# Patient Record
Sex: FEMALE | Race: WHITE | NOT HISPANIC OR LATINO | Employment: OTHER | URBAN - NONMETROPOLITAN AREA
[De-identification: names, ages, dates, MRNs, and addresses within clinical notes are randomized per-mention and may not be internally consistent; named-entity substitution may affect disease eponyms.]

---

## 2021-06-18 ENCOUNTER — MEDICAL CORRESPONDENCE (OUTPATIENT)
Dept: HEALTH INFORMATION MANAGEMENT | Facility: OTHER | Age: 86
End: 2021-06-18

## 2024-04-30 ENCOUNTER — TELEPHONE (OUTPATIENT)
Dept: FAMILY MEDICINE | Facility: OTHER | Age: 89
End: 2024-04-30

## 2024-04-30 ENCOUNTER — OFFICE VISIT (OUTPATIENT)
Dept: FAMILY MEDICINE | Facility: OTHER | Age: 89
End: 2024-04-30
Attending: FAMILY MEDICINE
Payer: MEDICARE

## 2024-04-30 VITALS
HEART RATE: 65 BPM | WEIGHT: 154.4 LBS | SYSTOLIC BLOOD PRESSURE: 120 MMHG | OXYGEN SATURATION: 94 % | TEMPERATURE: 98.2 F | DIASTOLIC BLOOD PRESSURE: 62 MMHG | HEIGHT: 62 IN | RESPIRATION RATE: 20 BRPM | BODY MASS INDEX: 28.41 KG/M2

## 2024-04-30 DIAGNOSIS — E55.9 VITAMIN D DEFICIENCY: ICD-10-CM

## 2024-04-30 DIAGNOSIS — Z76.89 ENCOUNTER TO ESTABLISH CARE: ICD-10-CM

## 2024-04-30 DIAGNOSIS — I10 ESSENTIAL HYPERTENSION: ICD-10-CM

## 2024-04-30 DIAGNOSIS — L89.311 PRESSURE INJURY OF RIGHT BUTTOCK, STAGE 1: ICD-10-CM

## 2024-04-30 DIAGNOSIS — Z00.00 ENCOUNTER FOR MEDICARE ANNUAL WELLNESS EXAM: Primary | ICD-10-CM

## 2024-04-30 DIAGNOSIS — F41.1 GENERALIZED ANXIETY DISORDER: ICD-10-CM

## 2024-04-30 DIAGNOSIS — Z91.81 AT RISK FOR FALLING: ICD-10-CM

## 2024-04-30 DIAGNOSIS — K59.09 CHRONIC CONSTIPATION: ICD-10-CM

## 2024-04-30 DIAGNOSIS — Z23 NEED FOR COVID-19 VACCINE: ICD-10-CM

## 2024-04-30 DIAGNOSIS — R32 URINARY INCONTINENCE, UNSPECIFIED TYPE: ICD-10-CM

## 2024-04-30 DIAGNOSIS — Z99.89 USES WALKER: ICD-10-CM

## 2024-04-30 DIAGNOSIS — L29.9 ITCHING: ICD-10-CM

## 2024-04-30 DIAGNOSIS — J45.30 MILD PERSISTENT ASTHMA WITHOUT COMPLICATION: ICD-10-CM

## 2024-04-30 LAB
ALBUMIN SERPL BCG-MCNC: 3.9 G/DL (ref 3.5–5.2)
ALP SERPL-CCNC: 90 U/L (ref 40–150)
ALT SERPL W P-5'-P-CCNC: 14 U/L (ref 0–50)
ANION GAP SERPL CALCULATED.3IONS-SCNC: 11 MMOL/L (ref 7–15)
AST SERPL W P-5'-P-CCNC: 22 U/L (ref 0–45)
BILIRUB SERPL-MCNC: 0.4 MG/DL
BUN SERPL-MCNC: 20.5 MG/DL (ref 8–23)
CALCIUM SERPL-MCNC: 9.8 MG/DL (ref 8.2–9.6)
CHLORIDE SERPL-SCNC: 100 MMOL/L (ref 98–107)
CREAT SERPL-MCNC: 0.86 MG/DL (ref 0.51–0.95)
DEPRECATED HCO3 PLAS-SCNC: 26 MMOL/L (ref 22–29)
EGFRCR SERPLBLD CKD-EPI 2021: 61 ML/MIN/1.73M2
ERYTHROCYTE [DISTWIDTH] IN BLOOD BY AUTOMATED COUNT: 13.6 % (ref 10–15)
GLUCOSE SERPL-MCNC: 89 MG/DL (ref 70–99)
HCT VFR BLD AUTO: 44.1 % (ref 35–47)
HGB BLD-MCNC: 14.4 G/DL (ref 11.7–15.7)
MCH RBC QN AUTO: 31.4 PG (ref 26.5–33)
MCHC RBC AUTO-ENTMCNC: 32.7 G/DL (ref 31.5–36.5)
MCV RBC AUTO: 96 FL (ref 78–100)
PLATELET # BLD AUTO: 258 10E3/UL (ref 150–450)
POTASSIUM SERPL-SCNC: 4.3 MMOL/L (ref 3.4–5.3)
PROT SERPL-MCNC: 7.4 G/DL (ref 6.4–8.3)
RBC # BLD AUTO: 4.59 10E6/UL (ref 3.8–5.2)
SODIUM SERPL-SCNC: 137 MMOL/L (ref 135–145)
VIT B12 SERPL-MCNC: 988 PG/ML (ref 232–1245)
WBC # BLD AUTO: 8.6 10E3/UL (ref 4–11)

## 2024-04-30 PROCEDURE — 82607 VITAMIN B-12: CPT | Mod: ZL | Performed by: FAMILY MEDICINE

## 2024-04-30 PROCEDURE — 99417 PROLNG OP E/M EACH 15 MIN: CPT | Performed by: FAMILY MEDICINE

## 2024-04-30 PROCEDURE — 99205 OFFICE O/P NEW HI 60 MIN: CPT | Performed by: FAMILY MEDICINE

## 2024-04-30 PROCEDURE — 90480 ADMN SARSCOV2 VAC 1/ONLY CMP: CPT

## 2024-04-30 PROCEDURE — 80053 COMPREHEN METABOLIC PANEL: CPT | Mod: ZL | Performed by: FAMILY MEDICINE

## 2024-04-30 PROCEDURE — 82306 VITAMIN D 25 HYDROXY: CPT | Mod: ZL | Performed by: FAMILY MEDICINE

## 2024-04-30 PROCEDURE — 85014 HEMATOCRIT: CPT | Mod: ZL | Performed by: FAMILY MEDICINE

## 2024-04-30 PROCEDURE — 36415 COLL VENOUS BLD VENIPUNCTURE: CPT | Mod: ZL | Performed by: FAMILY MEDICINE

## 2024-04-30 PROCEDURE — G0463 HOSPITAL OUTPT CLINIC VISIT: HCPCS | Mod: 25 | Performed by: FAMILY MEDICINE

## 2024-04-30 RX ORDER — ALBUTEROL SULFATE 90 UG/1
2 AEROSOL, METERED RESPIRATORY (INHALATION) EVERY 4 HOURS PRN
COMMUNITY
End: 2024-04-30

## 2024-04-30 RX ORDER — TRIAMTERENE/HYDROCHLOROTHIAZID 37.5-25 MG
1 TABLET ORAL DAILY
Qty: 90 TABLET | Refills: 4 | Status: SHIPPED | OUTPATIENT
Start: 2024-04-30

## 2024-04-30 RX ORDER — CITALOPRAM HYDROBROMIDE 10 MG/1
10 TABLET ORAL DAILY
Qty: 90 TABLET | Refills: 4 | Status: SHIPPED | OUTPATIENT
Start: 2024-04-30

## 2024-04-30 RX ORDER — BIOTIN 1 MG
1000 TABLET ORAL DAILY
COMMUNITY

## 2024-04-30 RX ORDER — SODIUM PHOSPHATE,MONO-DIBASIC 19G-7G/118
1 ENEMA (ML) RECTAL DAILY
COMMUNITY

## 2024-04-30 RX ORDER — FLUTICASONE PROPIONATE 220 UG/1
2 AEROSOL, METERED RESPIRATORY (INHALATION) 2 TIMES DAILY
Qty: 36 G | Refills: 11 | Status: SHIPPED | OUTPATIENT
Start: 2024-04-30

## 2024-04-30 RX ORDER — TRIAMTERENE/HYDROCHLOROTHIAZID 37.5-25 MG
1 TABLET ORAL DAILY
COMMUNITY
End: 2024-04-30

## 2024-04-30 RX ORDER — DILTIAZEM HYDROCHLORIDE 180 MG/1
180 CAPSULE, EXTENDED RELEASE ORAL DAILY
COMMUNITY
End: 2024-04-30

## 2024-04-30 RX ORDER — ASPIRIN 81 MG/1
81 TABLET ORAL DAILY
COMMUNITY

## 2024-04-30 RX ORDER — DILTIAZEM HYDROCHLORIDE 180 MG/1
180 CAPSULE, EXTENDED RELEASE ORAL DAILY
Qty: 90 CAPSULE | Refills: 4 | Status: SHIPPED | OUTPATIENT
Start: 2024-04-30

## 2024-04-30 RX ORDER — FLUTICASONE PROPIONATE 220 UG/1
2 AEROSOL, METERED RESPIRATORY (INHALATION) 2 TIMES DAILY
COMMUNITY
End: 2024-04-30

## 2024-04-30 RX ORDER — CITALOPRAM HYDROBROMIDE 10 MG/1
10 TABLET ORAL DAILY
COMMUNITY
End: 2024-04-30

## 2024-04-30 RX ORDER — MULTIVITAMIN
1 TABLET ORAL DAILY
COMMUNITY

## 2024-04-30 RX ORDER — TRIAMCINOLONE ACETONIDE 5 MG/G
OINTMENT TOPICAL
Qty: 45 G | Refills: 11 | Status: SHIPPED | OUTPATIENT
Start: 2024-04-30

## 2024-04-30 RX ORDER — ALBUTEROL SULFATE 90 UG/1
2 AEROSOL, METERED RESPIRATORY (INHALATION) EVERY 4 HOURS PRN
Qty: 18 G | Refills: 11 | Status: SHIPPED | OUTPATIENT
Start: 2024-04-30

## 2024-04-30 SDOH — HEALTH STABILITY: PHYSICAL HEALTH: ON AVERAGE, HOW MANY DAYS PER WEEK DO YOU ENGAGE IN MODERATE TO STRENUOUS EXERCISE (LIKE A BRISK WALK)?: 2 DAYS

## 2024-04-30 SDOH — HEALTH STABILITY: PHYSICAL HEALTH: ON AVERAGE, HOW MANY MINUTES DO YOU ENGAGE IN EXERCISE AT THIS LEVEL?: 10 MIN

## 2024-04-30 ASSESSMENT — PAIN SCALES - GENERAL: PAINLEVEL: NO PAIN (0)

## 2024-04-30 ASSESSMENT — SOCIAL DETERMINANTS OF HEALTH (SDOH): HOW OFTEN DO YOU GET TOGETHER WITH FRIENDS OR RELATIVES?: MORE THAN THREE TIMES A WEEK

## 2024-04-30 NOTE — PROGRESS NOTES
Preventive Care Visit  [facility] AND [facility]  KONG WHITAKER MD, Family Medicine  Apr 30, 2024    Assessment & Plan       ICD-10-CM    1. Encounter for Medicare annual wellness exam  Z00.00       2. Encounter to establish care  Z76.89       3. Need for COVID-19 vaccine  Z23 COVID-19 12+ (2023-24) (PFIZER)      4. Essential hypertension  I10 Vitamin B12     Comprehensive Metabolic Panel     Vitamin D Total     diltiazem ER (DILT-XR) 180 MG 24 hr capsule     triamterene-HCTZ (MAXZIDE-25) 37.5-25 MG tablet     Vitamin B12     Comprehensive Metabolic Panel     Vitamin D Total      5. Chronic constipation  K59.09       6. Mild persistent asthma without complication  J45.30 CBC W PLT No Diff     albuterol (PROAIR HFA/PROVENTIL HFA/VENTOLIN HFA) 108 (90 Base) MCG/ACT inhaler     fluticasone (FLOVENT HFA) 220 MCG/ACT inhaler     CBC W PLT No Diff      7. Generalized anxiety disorder  F41.1 citalopram (CELEXA) 10 MG tablet      8. Vitamin D deficiency  E55.9 Vitamin D Total     Vitamin D Total      9. Itching  L29.9 triamcinolone (KENALOG) 0.5 % external ointment      10. At risk for falling  Z91.81       11. Uses walker  Z99.89       12. Urinary incontinence, unspecified type  R32       13. Pressure injury of right buttock, stage 1  L89.311         Past medical history and past surgeries are updated today.  Care everywhere is accessed and records received.  Medications are reviewed including her over-the-counter medications.  Constipation care plan:    Patient Instructions   Metamucil - can take up to 3 times a day -  this would be the first choice  Next with this is colace (stool softener)    Kiwi     Labs today - kidney, potassium, vitamin D, vitamin B12, blood sugar, liver function    Asthma care reviewed.  In the past, she had had an as needed prescription for prednisone.  I would prefer that they contact the clinic or be seen at her assisted living for assessment.  Continue same treatment for  "hypertension  Her pressure sore shows intact skin with overlying color change, present for years.  She does sleep in the chair that likely contributes to this.  We discussed different barrier creams/ointments to reduce friction and recurrent injury.  Discussed fall risk reduction  COVID vaccination updated today  Continue same asthma care  Continue citalopram for anxiety  Patient states that she does have a living well, will bring in a copy.  In short, if it is felt that she could have meaningful recovery, she would want treatment.  No meaningful recovery, then no life-saving extensive measures to be taken.        79 minutes spent by me on the date of the encounter doing chart review, review of outside records, review of test results, interpretation of tests, patient visit, documentation, and discussion with family       BMI  Estimated body mass index is 28.01 kg/m  as calculated from the following:    Height as of this encounter: 1.581 m (5' 2.25\").    Weight as of this encounter: 70 kg (154 lb 6.4 oz).       Counseling  Appropriate preventive services were discussed with this patient, including applicable screening as appropriate for fall prevention, nutrition, physical activity, Tobacco-use cessation, weight loss and cognition.  Checklist reviewing preventive services available has been given to the patient.  Reviewed patient's diet, addressing concerns and/or questions.   She is at risk for lack of exercise and has been provided with information to increase physical activity for the benefit of her well-being.   The patient was instructed to see the dentist every 6 months.   She is at risk for psychosocial distress and has been provided with information to reduce risk.   Updated plan of care.  Patient reported difficulty with activities of daily living were addressed today.The patient was provided with written information regarding signs of hearing loss.   Information on urinary incontinence and treatment options " given to patient.         Ca Gamble is a 89 year old, presenting for the following:  Medicare Visit (Annual well visit) and Establish Care        4/30/2024    11:22 AM   Additional Questions   Roomed by Elisha ROY LPN   Accompanied by Accompanied by daughter, Niurka     Health Care Directive  Patient does not have a Health Care Directive or Living Will: Patient states has Advance Directive and will bring in a copy to clinic.    CARLTON Nam is a 89 year old female in with her daughter for Medicare wellness visit and to establish care.  She moved out of her home last fall, has been living with family members.  She now has a place at [facility].  She has been there for a week.  She states she is adjusting to the change, the routine.  Her apartment is small but appropriate, she likes the view and the balcony and that family can come and visit her there.    Asthma - onset for many years. Has flovent and albuterol.  Hasn't needed prednisone in a long time; hospitalized in the past 5+ years  Biotin - for her hair  Anxiety medication - celexa, helps to keep her steady    Recurrent UTIs, last was last summer    History of bilateral TKA - this has been very good   Sore on her bottom    Hypertension  - 15+ years, pretty stable on the amt   Constipation - chronic, goes once a day on averal, has used metamucil, dulcolax, senna and miralax            4/30/2024   General Health   How would you rate your overall physical health? Good   Feel stress (tense, anxious, or unable to sleep) Only a little   (!) STRESS CONCERN      4/30/2024   Nutrition   Diet: Other   If other, please elaborate: high fiber         4/30/2024   Exercise   Days per week of moderate/strenous exercise 2 days   Average minutes spent exercising at this level 10 min   (!) EXERCISE CONCERN      4/30/2024   Social Factors   Frequency of gathering with friends or relatives More than three times a week   Worry food won't last until get money to buy  more No   Food not last or not have enough money for food? No   Do you have housing?  Yes   Are you worried about losing your housing? No   Lack of transportation? No   Unable to get utilities (heat,electricity)? No         4/30/2024   Fall Risk   Fallen 2 or more times in the past year? No   Trouble with walking or balance? Yes   Gait Speed Test (Document in seconds) 7.59          4/30/2024   Activities of Daily Living- Home Safety   Needs help with the following daily activites Transportation    Shopping    Preparing meals    Housework    Bathing    Laundry    Medication administration    Money management   Safety concerns in the home None of the above         4/30/2024   Dental   Dentist two times every year? (!) NO         4/30/2024   Hearing Screening   Hearing concerns? (!) I NEED TO ASK PEOPLE TO SPEAK UP OR REPEAT THEMSELVES.    (!) IT'S HARD TO FOLLOW A CONVERSATION IN A NOISY RESTAURANT OR CROWDED ROOM.    (!) TROUBLE UNDERSTANDING SOFT OR WHISPERED SPEECH.         4/30/2024   Driving Risk Screening   Patient/family members have concerns about driving (!) DECLINE         4/30/2024   General Alertness/Fatigue Screening   Have you been more tired than usual lately? No         4/30/2024   Urinary Incontinence Screening   Bothered by leaking urine in past 6 months Yes         4/30/2024   TB Screening   Were you born outside of the [place]? No         Today's PHQ-2 Score:       4/30/2024    11:16 AM   PHQ-2 ( 1999 [employer])   Q1: Little interest or pleasure in doing things 0   Q2: Feeling down, depressed or hopeless 0   PHQ-2 Score 0   Q1: Little interest or pleasure in doing things Not at all   Q2: Feeling down, depressed or hopeless Not at all   PHQ-2 Score 0           4/30/2024   Substance Use   Alcohol more than 3/day or more than 7/wk No   Do you have a current opioid prescription? No   How severe/bad is pain from 1 to 10? 0/10 (No Pain)   Do you use any other substances recreationally? No     Social History  "    Tobacco Use    Smoking status: Never     Passive exposure: Past    Smokeless tobacco: Never   Vaping Use    Vaping status: Never Used   Substance Use Topics    Alcohol use: Yes     Comment: occasionally    Drug use: Never          Mammogram Screening - Mammography discussed and declined          Reviewed and updated as needed this visit by Provider       Med Hx  Surg Hx  Fam Hx            Past Medical History:   Diagnosis Date    Asthma     Essential hypertension      Past Surgical History:   Procedure Laterality Date    CATARACT EXTRACTION Bilateral     REPLACEMENT TOTAL KNEE Bilateral      Current providers sharing in care for this patient include:  Patient Care Team:  Dotty Smith MD as PCP - General (Family Medicine)    The following health maintenance items are reviewed in Epic and correct as of today:  Health Maintenance   Topic Date Due    ASTHMA ACTION PLAN  Never done    ASTHMA CONTROL TEST  Never done    DTAP/TDAP/TD IMMUNIZATION (1 - Tdap) Never done    RSV VACCINE (Pregnancy & 60+) (1 - 1-dose 60+ series) Never done    MEDICARE ANNUAL WELLNESS VISIT  Never done    FALL RISK ASSESSMENT  04/30/2025    ADVANCE CARE PLANNING  04/30/2029    PHQ-2 (once per calendar year)  Completed    INFLUENZA VACCINE  Completed    Pneumococcal Vaccine: 65+ Years  Completed    COVID-19 Vaccine  Completed    IPV IMMUNIZATION  Aged Out    HPV IMMUNIZATION  Aged Out    MENINGITIS IMMUNIZATION  Aged Out    RSV MONOCLONAL ANTIBODY  Aged Out    ZOSTER IMMUNIZATION  Discontinued         Review of Systems  She feels that overall her hearing is pretty good  History of cataract surgery, wears glasses  Memory, problems with name recall     Objective    Exam  /62 (BP Location: Right arm, Patient Position: Sitting, Cuff Size: Adult Regular)   Pulse 65   Temp 98.2  F (36.8  C) (Temporal)   Resp 20   Ht 1.581 m (5' 2.25\")   Wt 70 kg (154 lb 6.4 oz)   LMP 04/30/1979   SpO2 94%   Breastfeeding No   BMI " "28.01 kg/m     Estimated body mass index is 28.01 kg/m  as calculated from the following:    Height as of this encounter: 1.581 m (5' 2.25\").    Weight as of this encounter: 70 kg (154 lb 6.4 oz).    Physical Exam  GENERAL: alert and no distress  EYES: Eyes grossly normal to inspection, PERRL and conjunctivae and sclerae normal  HENT: ear canals and TM's normal, nose and mouth without ulcers or lesions  RESP: lungs clear to auscultation - no rales, rhonchi or wheezes  CV: RRR  ABDOMEN: soft, nontender, no hepatosplenomegaly, no masses and bowel sounds normal  SKIN: right buttock with 3cm area of erythema but skin is intact   PSYCH: mentation appears normal, affect normal/bright        4/30/2024   Mini Cog   Clock Draw Score 2 Normal   3 Item Recall 3 objects recalled   Mini Cog Total Score 5     Results for orders placed or performed in visit on 04/30/24   Vitamin B12     Status: Normal   Result Value Ref Range    Vitamin B12 988 232 - 1,245 pg/mL   Comprehensive Metabolic Panel     Status: Abnormal   Result Value Ref Range    Sodium 137 135 - 145 mmol/L    Potassium 4.3 3.4 - 5.3 mmol/L    Carbon Dioxide (CO2) 26 22 - 29 mmol/L    Anion Gap 11 7 - 15 mmol/L    Urea Nitrogen 20.5 8.0 - 23.0 mg/dL    Creatinine 0.86 0.51 - 0.95 mg/dL    GFR Estimate 61 >60 mL/min/1.73m2    Calcium 9.8 (H) 8.2 - 9.6 mg/dL    Chloride 100 98 - 107 mmol/L    Glucose 89 70 - 99 mg/dL    Alkaline Phosphatase 90 40 - 150 U/L    AST 22 0 - 45 U/L    ALT 14 0 - 50 U/L    Protein Total 7.4 6.4 - 8.3 g/dL    Albumin 3.9 3.5 - 5.2 g/dL    Bilirubin Total 0.4 <=1.2 mg/dL   CBC W PLT No Diff     Status: Normal   Result Value Ref Range    WBC Count 8.6 4.0 - 11.0 10e3/uL    RBC Count 4.59 3.80 - 5.20 10e6/uL    Hemoglobin 14.4 11.7 - 15.7 g/dL    Hematocrit 44.1 35.0 - 47.0 %    MCV 96 78 - 100 fL    MCH 31.4 26.5 - 33.0 pg    MCHC 32.7 31.5 - 36.5 g/dL    RDW 13.6 10.0 - 15.0 %    Platelet Count 258 150 - 450 10e3/uL            Signed " Electronically by: KONG WHITAKER MD

## 2024-04-30 NOTE — PATIENT INSTRUCTIONS
Metamucil - can take up to 3 times a day -  this would be the first choice  Next with this is colace (stool softener)    Kiwi       Labs today - kidney, potassium, vitamin D, vitamin B12, blood sugar, liver function

## 2024-04-30 NOTE — TELEPHONE ENCOUNTER
Patient and daughter inquiring about a lotion/ointment for itching from moles on back.  Forgot to address at appointment.    Elisha Huang LPN on 4/30/2024 at 3:18 PM

## 2024-04-30 NOTE — NURSING NOTE
"Chief Complaint   Patient presents with    Medicare Visit     Annual well visit    Establish Care       Initial /62 (BP Location: Right arm, Patient Position: Sitting, Cuff Size: Adult Regular)   Pulse 65   Temp 98.2  F (36.8  C) (Temporal)   Resp 20   Ht 1.581 m (5' 2.25\")   Wt 70 kg (154 lb 6.4 oz)   LMP 04/30/1979   SpO2 94%   Breastfeeding No   BMI 28.01 kg/m   Estimated body mass index is 28.01 kg/m  as calculated from the following:    Height as of this encounter: 1.581 m (5' 2.25\").    Weight as of this encounter: 70 kg (154 lb 6.4 oz).    Medication Review: complete    The next two questions are to help us understand your food security.  If you are feeling you need any assistance in this area, we have resources available to support you today.          4/30/2024   SDOH- Food Insecurity   Within the past 12 months, did you worry that your food would run out before you got money to buy more? N   Within the past 12 months, did the food you bought just not last and you didn t have money to get more? N       Health Care Directive:  Patient does not have a Health Care Directive or Living Will: Patient states has Advance Directive and will bring in a copy to clinic.    Elisha Huang LPN      "

## 2024-04-30 NOTE — TELEPHONE ENCOUNTER
Patient's daughter, Niurka, called back and I informed her that prescription was sent to pharmacy.    Elisha Huang LPN on 4/30/2024 at 3:48 PM

## 2024-05-01 LAB — VIT D+METAB SERPL-MCNC: 40 NG/ML (ref 20–50)

## 2024-05-19 ENCOUNTER — HEALTH MAINTENANCE LETTER (OUTPATIENT)
Age: 89
End: 2024-05-19

## 2024-05-21 ENCOUNTER — NURSING HOME VISIT (OUTPATIENT)
Dept: GERIATRICS | Facility: OTHER | Age: 89
End: 2024-05-21
Attending: NURSE PRACTITIONER
Payer: MEDICARE

## 2024-05-21 DIAGNOSIS — M15.0 PRIMARY OSTEOARTHRITIS INVOLVING MULTIPLE JOINTS: Primary | ICD-10-CM

## 2024-05-21 DIAGNOSIS — L89.300 PRESSURE INJURY OF BUTTOCK, UNSTAGEABLE, UNSPECIFIED LATERALITY (H): ICD-10-CM

## 2024-05-21 DIAGNOSIS — Z96.653 HISTORY OF BILATERAL KNEE ARTHROPLASTY: ICD-10-CM

## 2024-05-21 DIAGNOSIS — M79.604 PAIN IN BOTH LOWER EXTREMITIES: ICD-10-CM

## 2024-05-21 DIAGNOSIS — M79.605 PAIN IN BOTH LOWER EXTREMITIES: ICD-10-CM

## 2024-05-21 DIAGNOSIS — N39.46 MIXED INCONTINENCE: ICD-10-CM

## 2024-05-21 DIAGNOSIS — Z78.9 LIVING IN ASSISTED LIVING: ICD-10-CM

## 2024-05-21 DIAGNOSIS — Z74.09 MOBILITY IMPAIRED: ICD-10-CM

## 2024-05-21 PROCEDURE — 99349 HOME/RES VST EST MOD MDM 40: CPT | Performed by: NURSE PRACTITIONER

## 2024-05-21 NOTE — PROGRESS NOTES
Mavis Nam is a 89 year old female being seen today for acute visit at [facility].    Code Status: Advance Directives: YES   Health Care Power of : Extended Emergency Contact Information  Primary Emergency Contact: Cyril Bah  Home Phone: [phone]  Relation: Daughter     Allergies: Chocolate and Coffee bean extract [coffea arabica]     Chief Complaint / HPI: Face-to-face visit with resident who recently moved into assisted living apartment from [facility] to be closer to family--has children that are very involved in her care.  Facility RN had requested a visit for left leg pain that resident reports will sometimes cause her legs to buckle when she is walking with her walker--- has osteoarthritis multiple joints, history of bilateral total knee arthroplasties about 12 years ago--reports have been done at the same time.  History of falls though reports has not had any falls since moving in.  Does have some lower extremity edema--staff have felt that the left has been a little more than the right--today looks diffuse regarding dorsum of feet and ankles  Daughter reports current facility more sodium intake with meals--previously was watching her salt intake--currently would not be able to independently put on her own knee-high compression stockings.  Family manages medications and sets up her box--- currently not receiving any services other than meals from assisted living facility.  Resident reports chronic intermittent history of intergluteal cleft pressure area that is currently closed however in the past has blistered suspect exacerbated by incontinence--- has been using Vaseline.  Daughter reports this has been ongoing and flares intermittently family has provided sheepskin cover to her recliner which she spends most of her time.  Recently establish care with Dr. Deep Pradhan--multiple labs were completed which were all within normal limits  No other immediate concerns raised  Nursing  staff do not raise any other concerns at this time    Past Medical, Surgical, Family and Social History reviewed: YES.     Medications: reviewed  Medications - recent changes:     Review of Systems:  General: positive for weakness  Skin: positive for pressure injury  Musculoskeletal: positive for arthritis, joint pain, and muscular weakness  : Positive incontinence    Toileting:    Continent of Bowel: Yes   Continent of Bladder: No  Mobility: walker    Recent Labs: reviewed    Current Therapies: none    Exam:  Vital signs reviewed.   GENERAL APPEARANCE: alert and no distress  EYES: Eyes grossly normal to inspection, conjunctivae and sclerae normal  CV: regular rate, 1-2 ankle peripheral edema and peripheral pulses strong  ABDOMEN: soft, nontender  MS: no musculoskeletal defects are noted and gait is unsteady--walker dependent--bilateral lower extremity pain with flexion and walking--point tenderness left ischium--no evidence of focal erythema or edema  SKIN: Great toenails bilaterally opaque fungal thickened about 1 cm thick, remaining toenails no evidence of onychomycosis  Intergluteal cleft erythemic central and both sides, area of tenderness both sides approximately 3 cm skin currently intact however friable and at risk for breakdown--tender to palpation  Incontinence urine.  NEURO: Alert,mentation intact and speech normal  PSYCH: mentation appears normal and affect normal/bright    Assessment and Plan:    Face-to-face visit completed for home care therapy services--resident and daughter very agreeable and motivated to start physical therapy and OT (when available)to optimize mobility  Improve range of motion, reduce pain and optimize independence in assisted living home with a essential activities of daily living and mobility.  Skilled nursing to assess skin integrity along with Therapy to provide pressure relieving treatment and recommendations on DME to offload and prevent breakdown.  RN spoke with patient  and daughter who would like to choose [facility].      Will provide PRN toenail care for onychomycosis overgrown nail maintenance.    Discussed barrier cream along with pressure relieving interventions--currently using vaseline which is probably fine if pressure relieving interventions in place.    Discussed lower extremity edema which I agree is likely worsened with treated water and high sodium meals served at facility--resident will continue to elevate legs when sitting in apartment.  Discussed knee-high graduated compression stockings or even over-the-counter knee-high supportive stockings--dilemma as resident unable to don stockings independently and family would like to avoid additional service costs as long as possible--- will revisit. Skilled nursing to assess and make recommendations.          (M15.9) Primary osteoarthritis involving multiple joints  (primary encounter diagnosis)    Plan: Home Care Referral            (L89.300) Pressure injury of buttock, unstageable, unspecified laterality (H)    Plan: Home Care Referral           (Z74.09) Mobility impaired    Plan: Home Care Referral          (N39.46) Mixed incontinence    Plan: Home Care Referral            (Z59.3) Living in assisted living    Plan: Home Care Referral            (M79.604,  M79.605) Pain in both lower extremities    Plan: Home Care Referral            (Z96.653) History of bilateral knee arthroplasty  Plan: Home Care Referral

## 2024-05-22 PROCEDURE — G0180 MD CERTIFICATION HHA PATIENT: HCPCS | Performed by: NURSE PRACTITIONER

## 2024-05-28 ENCOUNTER — MEDICAL CORRESPONDENCE (OUTPATIENT)
Dept: HEALTH INFORMATION MANAGEMENT | Facility: OTHER | Age: 89
End: 2024-05-28
Payer: MEDICARE

## 2024-05-28 ENCOUNTER — TELEPHONE (OUTPATIENT)
Dept: FAMILY MEDICINE | Facility: OTHER | Age: 89
End: 2024-05-28
Payer: MEDICARE

## 2024-05-28 NOTE — TELEPHONE ENCOUNTER
The occupational therapy evaluation and treatment that was ordered was completed on 5/28/24    Request for additional Home Care Occupational Therapy orders as follows:        Continuation frequency =   ___1___  x week x  ___1___ week(s)    Effective date = 5/29/24      Continuation frequency =   ___2___  x week x  ___3___ week(s)    Effective date = 6/3/24    Intervention include:    ADL skills training  Education - home safety  Instruction - home exercise program  Therapeutic exercise  Adaptive equipment     Positioning      For therapist: Zhane Thompson, OTR/L  Please respond with .HOMECAREAGREE, if you are in agreement. Please sign with your comments and signature, if you are not in agreement.

## 2024-05-28 NOTE — TELEPHONE ENCOUNTER
I agree with the Home Care order requests below.  Ana Maria Roberson, GINI CNP on 5/28/2024 at 5:19 PM     Thankyou    JKC

## 2024-06-03 ENCOUNTER — TELEPHONE (OUTPATIENT)
Dept: FAMILY MEDICINE | Facility: OTHER | Age: 89
End: 2024-06-03
Payer: MEDICARE

## 2024-06-03 DIAGNOSIS — M15.0 PRIMARY OSTEOARTHRITIS INVOLVING MULTIPLE JOINTS: Primary | ICD-10-CM

## 2024-06-03 DIAGNOSIS — L89.311 PRESSURE INJURY OF RIGHT BUTTOCK, STAGE 1: ICD-10-CM

## 2024-06-03 NOTE — TELEPHONE ENCOUNTER
Ana Maria Roberson NP reviewed and completed the following home care or hospice form(s) for Home Care. This covers the certification period effective 5/22/2024 to 7/20/2024.  Diana Charles RN on 6/3/2024 at 9:18 AM

## 2024-06-06 ENCOUNTER — TELEPHONE (OUTPATIENT)
Dept: FAMILY MEDICINE | Facility: OTHER | Age: 89
End: 2024-06-06
Payer: MEDICARE

## 2024-06-06 NOTE — TELEPHONE ENCOUNTER
Per therapy, patient now has worsening redness/sore on her bottom.  Ok for nursing to see one time this week for wound assessment?    Thanks.     Roya Ace LPN on 6/6/2024 at 1:46 PM      Yes please and thankGINI Ledesma CNP   June 6, 2024

## 2024-06-07 ENCOUNTER — NURSING HOME VISIT (OUTPATIENT)
Dept: GERIATRICS | Facility: OTHER | Age: 89
End: 2024-06-07
Attending: NURSE PRACTITIONER
Payer: MEDICARE

## 2024-06-07 ENCOUNTER — MEDICAL CORRESPONDENCE (OUTPATIENT)
Dept: HEALTH INFORMATION MANAGEMENT | Facility: OTHER | Age: 89
End: 2024-06-07

## 2024-06-07 ENCOUNTER — TELEPHONE (OUTPATIENT)
Dept: FAMILY MEDICINE | Facility: OTHER | Age: 89
End: 2024-06-07

## 2024-06-07 DIAGNOSIS — Z74.09 LIMITED MOBILITY: ICD-10-CM

## 2024-06-07 DIAGNOSIS — N39.46 MIXED INCONTINENCE: ICD-10-CM

## 2024-06-07 DIAGNOSIS — L89.302 PRESSURE INJURY OF BUTTOCK, STAGE 2, UNSPECIFIED LATERALITY (H): Primary | ICD-10-CM

## 2024-06-07 DIAGNOSIS — Z78.9 LIVING IN ASSISTED LIVING: ICD-10-CM

## 2024-06-07 PROCEDURE — 99348 HOME/RES VST EST LOW MDM 30: CPT | Performed by: NURSE PRACTITIONER

## 2024-06-07 NOTE — TELEPHONE ENCOUNTER
Request for Home Care Skilled Nursing orders as follows:    Continuation frequency        1x wk x 1 wk on 6/8/24    Then:    3x w x 2 wk starting on 6/10/24   3 PRN- wound care, change in condition            Interventions include:    Assessment  O2 sat monitoring (all disciplines as needed)  Fall risk: instruct on fall prevention strategies, home safety, assess environment   Instruct on pressure relief methods to prevent pressure ulcers      Wound care as follows: sacral bordered dressing         Please respond with .HOMECAREAGREE, if you are in agreement. Please sign with your comments and signature, if you are not in agreement.    Thanks!     Sharee Ennis, RN on 6/7/2024 at 4:59 PM

## 2024-06-07 NOTE — TELEPHONE ENCOUNTER
I'm meeting Sharee there today to have a look too  Thanks  Ana Maria Roberson, GINI CNP   June 7, 2024

## 2024-06-08 NOTE — TELEPHONE ENCOUNTER
I agree with the Home Care order requests below.  Ana Maria Roberson, GINI CNP on 6/7/2024 at 10:03 PM       Thankyou    JKC

## 2024-06-11 ENCOUNTER — NURSING HOME VISIT (OUTPATIENT)
Dept: GERIATRICS | Facility: OTHER | Age: 89
End: 2024-06-11
Attending: NURSE PRACTITIONER
Payer: MEDICARE

## 2024-06-11 VITALS — WEIGHT: 157 LBS | DIASTOLIC BLOOD PRESSURE: 68 MMHG | SYSTOLIC BLOOD PRESSURE: 104 MMHG | BODY MASS INDEX: 28.49 KG/M2

## 2024-06-11 DIAGNOSIS — Z78.9 LIVING IN ASSISTED LIVING: ICD-10-CM

## 2024-06-11 DIAGNOSIS — R60.0 BILATERAL LEG EDEMA: ICD-10-CM

## 2024-06-11 DIAGNOSIS — M15.0 PRIMARY OSTEOARTHRITIS INVOLVING MULTIPLE JOINTS: ICD-10-CM

## 2024-06-11 DIAGNOSIS — Z79.899 ENCOUNTER FOR MEDICATION REVIEW: Primary | ICD-10-CM

## 2024-06-11 DIAGNOSIS — L89.302 PRESSURE INJURY OF BUTTOCK, STAGE 2, UNSPECIFIED LATERALITY (H): ICD-10-CM

## 2024-06-11 PROCEDURE — 99349 HOME/RES VST EST MOD MDM 40: CPT | Performed by: NURSE PRACTITIONER

## 2024-06-11 NOTE — PROGRESS NOTES
Mavis Nam is a 89 year old female being seen today for follow up visit at [facility] ([place], [place]).    Code Status: Advance Directives: YES  Health Care Power of : Extended Emergency Contact Information  Primary Emergency Contact: Cyril Bah  Home Phone: [phone]  Relation: Daughter     Allergies: Chocolate and Coffee bean extract [coffea arabica]     Chief Complaint / HPI: Follow-up on bilateral intergluteal fold and buttocks pressure injury ulcers--superficial however has multiple both sides requiring wound care and surveillance.  Also right posterior buttock pain--physical therapy feels pain at posterior sciatic insertion site where it meets ischial tuberosity.  Occupational Therapy present today to work on pressure relieving strategies  We discussed shifting weight in recliner, considering a new recliner with a better fit that provides better positioning, considering hospital bed for positioning features with leg elevation, head elevation which is the reason that she sleeps in her recliner uncomfortable to sleep laying flat due to her chronic Asthma, not interested in a hospital bed at this time.  Physical therapy coming today and will evaluate pain sources, therapy treatment plan and recommendations on appropriate recliner chair for positioning, pressure relief and will better facilitate self transfers.  Other issue that daughter raised last week was her bilateral lower leg edema--daughter reports she has had this in the remote past particularly when she was sitting with legs in dependent position and riding in the car  Has not been an issue over the last few years while living at family homes.  She does admit that she enjoys the food at the assisted living and probably higher in sodium than what she is used to.  Daughter is a nurse and does not endorse any known history of CHF--she does have hypertension is on a thiazide diuretic--daughter reports was on Lasix at 1 time however this was  discontinued.  Currently they are not receiving any services at the facility--family is providing cares and services including weights, blood pressure, medication set up for self administration and assistance with showers.  Staff and resident do not report any falls since moving into facility about 5 weeks ago.    Past Medical, Surgical, Family and Social History reviewed: YES.     Medications: Reviewed and reconciled  Medications - recent changes: none    Review of Systems:  Skin: positive for rash, pressure ulcers  CV: positive for lower extremity edema  : positive for incontinence  Musculoskeletal: positive for arthritis, joint pain, and muscular weakness  All other systems negative  Toileting:    Continent of Bowel: Yes   Continent of Bladder:  partially  Mobility: walker    Recent Labs: most recent reviewed      Current Therapies: physical therapy, occupational therapy, and skilled nursing Home Care    Exam:  Vital signs reviewed.   GENERAL APPEARANCE: healthy, alert and no distress  EYES: Eyes grossly normal to inspection, PERRL and conjunctivae and sclerae normal  RESP: lungs clear to auscultation - no rales, rhonchi or wheezes  CV: regular rate and rhythm, normal S1 S2, no S3 or S4, no murmur, click or rub, no peripheral edema and peripheral pulses strong  ABDOMEN: soft, nontender, no hepatosplenomegaly, no masses and bowel sounds normal  MS: no musculoskeletal defects are noted and gait is age appropriate without ataxia  SKIN: no suspicious lesions or rashes  NEURO: Normal strength and tone, sensory exam grossly normal, mentation intact and speech normal  PSYCH: mentation appears normal and affect normal/bright    Assessment and Plan:    Very pleasant gracious lady who is very social has acclimated well to assisted living.  Priority issues right now are healing pressure injury ulcers on intergluteal cleft and medial buttocks from persistent pressure  Lower extremity edema and mobility.  Pressure injury  ulcers are healing nicely--[facility] skilled nursing is following 3 times a week--treatment is currently wound wash, application of sacral Mepilex dressing to provide protection and offloading  Along with offloading cushion per Occupational Therapy recommendations.  Revisited discussion on hospital bed to provide positioning for head elevation, leg elevation--however Mavis remains adamant that she does not want a hospital bed even to alternate supine positioning.  Respect her wishes and will work with what is acceptable at this time.    Physical therapy has identified right ischial tuberosity piriformis pressure on sciatic nerve--has provided assessment and options to family who are motivated to obtain recommended positioning care and therapeutic exercises  That will provide adequate offloading, back support, facilitate self transfers and provide weight shifting for further pressure relief along with pressure relieving seat cushion.  Piriformis stretch exercises done during therapy and staff and family can support stretches to relieve point tenderness to problematic pain source.  Other contributing factors degenerative arthritis, possibly sacroiliitis right side.    Daughter and family members will obtain accurate weights and record in notebook in apartment along with blood pressure checks and will follow-up on edema next week--- daughter did bring in graduated knee-high compression stockings that family can assist with donning in the morning and assistance with removal at at bedtime.  Patient and daughter would like issues addressed conservatively at this time--no action indicated at this time with medication adjustments.  Suspect combination of dependent positioning, sodium, venous insufficiency.    Will follow-up on rounds to review weights,BPs,edema status and pressure ulcers and PRN        (Z94.651) Encounter for medication review  (primary encounter diagnosis)      (L89.302) Pressure injury of  buttock, stage 2, unspecified laterality (H)      (Z59.3) Living in assisted living      (R60.0) Bilateral leg edema      (M15.9) Primary osteoarthritis involving multiple joints

## 2024-06-14 ENCOUNTER — MEDICAL CORRESPONDENCE (OUTPATIENT)
Dept: HEALTH INFORMATION MANAGEMENT | Facility: OTHER | Age: 89
End: 2024-06-14
Payer: MEDICARE

## 2024-06-26 ENCOUNTER — MEDICAL CORRESPONDENCE (OUTPATIENT)
Dept: HEALTH INFORMATION MANAGEMENT | Facility: OTHER | Age: 89
End: 2024-06-26
Payer: MEDICARE

## 2024-09-17 ENCOUNTER — NURSING HOME VISIT (OUTPATIENT)
Dept: GERIATRICS | Facility: OTHER | Age: 89
End: 2024-09-17
Attending: NURSE PRACTITIONER
Payer: MEDICARE

## 2024-09-17 DIAGNOSIS — L60.2 OVERGROWN TOENAILS: Primary | ICD-10-CM

## 2024-09-17 DIAGNOSIS — Z78.9 LIVING IN ASSISTED LIVING: ICD-10-CM

## 2024-09-17 PROCEDURE — 99347 HOME/RES VST EST SF MDM 20: CPT | Performed by: NURSE PRACTITIONER

## 2024-09-18 NOTE — PROGRESS NOTES
Mavis Nam is a 89 year old female being seen today for follow up visit visit at [facility].    Code Status: Advance Directives: YES-.   Health Care Power of : Extended Emergency Contact Information  Primary Emergency Contact: Cyril Bah  Home Phone: [phone]  Relation: Daughter     Allergies: Chocolate and Coffee bean extract [coffea arabica]     Chief Complaint / HPI: Follow-up as requested for fungal thickened overgrown toenails which are not amenable to traditional clippers.  Resident had moved into assisted living within the past year, family very involved, worked with physical therapy  And able to ambulate with walker and optimally independent in assisted living home--overall appears happy and comfortable, she is very social participates in activities--- no concerns raised other than foot and nail concerns    Past Medical, Surgical, Family and Social History reviewed: YES.     Medications: family provides medication administration  Medications - recent changes:     Review of Systems:  General: positive for weakness  Skin: positive for nail changes  Musculoskeletal: positive for arthritis, joint pain, and muscular weakness  All other systems negative  Toileting:    Continent of Bowel: Yes   Continent of Bladder: No  Mobility: walker    Recent Labs: None      Current Therapies: none    Exam:  Vital signs reviewed.   GENERAL APPEARANCE: alert and no distress  EYES: Eyes grossly normal to inspection  CV: regular rate and rhythm, normal S1 S2, no S3 or S4, no murmur, click or rub, 1-2+ equal peripheral edema and peripheral pulses strong  MS: no musculoskeletal defects are noted and gait is age appropriate with walker  SKIN: Bilateral great toenails opaque fungal thickened about a centimeter thick and long extending a little beyond superior surface of great toe--- nails Dremeled to smooth down easily both feet, other toenails clipped with traditional clippers and Dremel smoothed, no  complications, tolerated well  Foot exam: normal DP and PT pulses, normal sensory exam, onychomycosis, and nail exam onychomycosis of the toenails   NEURO: mentation intact and speech normal  PSYCH: mentation appears normal and affect normal/bright    Assessment and Plan:    Very pleasant gracious lady who resides in assisted living home, has acclimated well, independent with most cares require some assistance with grooming--independent with mobility walker dependent    Foot and nail care completed as noted above, no complications, tolerated well    Follow-up as needed    20 minutes spent in direct visit and exam and nail debridement procedure          (L60.2) Overgrown toenails  (primary encounter diagnosis)    (Z59.3) Living in assisted living

## 2024-10-29 ENCOUNTER — NURSING HOME VISIT (OUTPATIENT)
Dept: GERIATRICS | Facility: OTHER | Age: 89
End: 2024-10-29
Attending: NURSE PRACTITIONER
Payer: MEDICARE

## 2024-10-29 DIAGNOSIS — B37.2 CANDIDIASIS, INTERTRIGO: ICD-10-CM

## 2024-10-29 DIAGNOSIS — Z78.9 LIVING IN ASSISTED LIVING: ICD-10-CM

## 2024-10-29 DIAGNOSIS — L30.4 INTERTRIGINOUS DERMATITIS ASSOCIATED WITH MOISTURE: Primary | ICD-10-CM

## 2024-10-29 PROCEDURE — 99347 HOME/RES VST EST SF MDM 20: CPT | Performed by: NURSE PRACTITIONER

## 2024-10-29 NOTE — PROGRESS NOTES
Mavis Nam is a 89 year old female being seen today for acute and follow up visit visit at [facility].    Code Status: Hospitalist to discuss with patient and family further.   Health Care Power of : Extended Emergency Contact Information  Primary Emergency Contact: Cyril Bah  Home Phone: [phone]  Relation: Daughter     Allergies: Chocolate and Coffee bean extract [coffea arabica]     Chief Complaint / HPI: Followup on request from facility nurse for possible yeast dermatitis. Resident has been using Vagisil and now zinc oxide and feels a little better but continues to be pruritic.  Daughter was present during visit, family sets up medications.  Did have a telephone conference with Andie the other daughter who is involved in her care along with the daughter visiting and Mavis about options  And family very agreeable with recommendations      Past Medical, Surgical, Family and Social History reviewed: YES.     Medications: Reviewed  Medications - recent changes:     Review of Systems:  Skin: positive for rash, itching  : positive for yeast dermatitis  Musculoskeletal: positive for arthritis and muscular weakness  Toileting:    Continent of Bowel: Yes   Continent of Bladder: No  Mobility: walker    Recent Labs: None      Current Therapies: none    Exam:  Vital signs reviewed.   GENERAL APPEARANCE: alert and no distress  EYES: Eyes grossly normal to inspection  ABDOMEN: soft, nontender  MS: no musculoskeletal defects are noted and gait is walker dependent  SKIN: Erythemic pruritic rash bilateral groin folds and perineum--- no open or draining areas--generally clean and dry  NEURO: Alert,mentation intact and speech normal  PSYCH: mentation appears normal and affect normal/bright    Assessment and Plan:    Intertriginous dermatitis secondary to moisture from urinary incontinence--- recommend over-the-counter topical clotrimazole for another OTC on that order twice daily until healed    Did  speak with her daughter Andie who is involved in her healthcare and she will purchase OTC as family sets up medications and oversees medication management.  Also discussed a barrier cream should be utilized to protect the skin and provide some prevention from further yeast dermatitis    15 minutes spent in direct face-to-face visit, exam of area in question, medication review and treatment management      (L30.4) Intertriginous dermatitis associated with moisture  (primary encounter diagnosis)      (B37.2) Candidiasis, intertrigo      (Z78.9) Living in assisted living

## 2024-11-11 NOTE — PATIENT INSTRUCTIONS
Patient Education   Preventive Care Advice   This is general advice given by our system to help you stay healthy. However, your care team may have specific advice just for you. Please talk to your care team about your preventive care needs.  Nutrition  Eat 5 or more servings of fruits and vegetables each day.  Try wheat bread, brown rice and whole grain pasta (instead of white bread, rice, and pasta).  Get enough calcium and vitamin D. Check the label on foods and aim for 100% of the RDA (recommended daily allowance).  Lifestyle  Exercise at least 150 minutes each week  (30 minutes a day, 5 days a week).  Do muscle strengthening activities 2 days a week. These help control your weight and prevent disease.  No smoking.  Wear sunscreen to prevent skin cancer.  Have a dental exam and cleaning every 6 months.  Yearly exams  See your health care team every year to talk about:  Any changes in your health.  Any medicines your care team has prescribed.  Preventive care, family planning, and ways to prevent chronic diseases.  Shots (vaccines)   HPV shots (up to age 26), if you've never had them before.  Hepatitis B shots (up to age 59), if you've never had them before.  COVID-19 shot: Get this shot when it's due.  Flu shot: Get a flu shot every year.  Tetanus shot: Get a tetanus shot every 10 years.  Pneumococcal, hepatitis A, and RSV shots: Ask your care team if you need these based on your risk.  Shingles shot (for age 50 and up)  General health tests  Diabetes screening:  Starting at age 35, Get screened for diabetes at least every 3 years.  If you are younger than age 35, ask your care team if you should be screened for diabetes.  Cholesterol test: At age 39, start having a cholesterol test every 5 years, or more often if advised.  Bone density scan (DEXA): At age 50, ask your care team if you should have this scan for osteoporosis (brittle bones).  Hepatitis C: Get tested at least once in your life.  STIs (sexually  transmitted infections)  Before age 24: Ask your care team if you should be screened for STIs.  After age 24: Get screened for STIs if you're at risk. You are at risk for STIs (including HIV) if:  You are sexually active with more than one person.  You don't use condoms every time.  You or a partner was diagnosed with a sexually transmitted infection.  If you are at risk for HIV, ask about PrEP medicine to prevent HIV.  Get tested for HIV at least once in your life, whether you are at risk for HIV or not.  Cancer screening tests  Cervical cancer screening: If you have a cervix, begin getting regular cervical cancer screening tests starting at age 21.  Breast cancer scan (mammogram): If you've ever had breasts, begin having regular mammograms starting at age 40. This is a scan to check for breast cancer.  Colon cancer screening: It is important to start screening for colon cancer at age 45.  Have a colonoscopy test every 10 years (or more often if you're at risk) Or, ask your provider about stool tests like a FIT test every year or Cologuard test every 3 years.  To learn more about your testing options, visit:   .  For help making a decision, visit:   https://bit.ly/ol94511.  Prostate cancer screening test: If you have a prostate, ask your care team if a prostate cancer screening test (PSA) at age 55 is right for you.  Lung cancer screening: If you are a current or former smoker ages 50 to 80, ask your care team if ongoing lung cancer screenings are right for you.  For informational purposes only. Not to replace the advice of your health care provider. Copyright   2023 [employer]. All rights reserved. Clinically reviewed by the [facility]. [employer] [id number] - REV 01/24.

## 2024-11-12 ENCOUNTER — OFFICE VISIT (OUTPATIENT)
Dept: GERIATRICS | Facility: OTHER | Age: 89
End: 2024-11-12
Attending: NURSE PRACTITIONER
Payer: MEDICARE

## 2024-11-12 VITALS
HEART RATE: 73 BPM | OXYGEN SATURATION: 93 % | TEMPERATURE: 97.3 F | DIASTOLIC BLOOD PRESSURE: 88 MMHG | SYSTOLIC BLOOD PRESSURE: 162 MMHG | RESPIRATION RATE: 16 BRPM

## 2024-11-12 DIAGNOSIS — J45.30 MILD PERSISTENT ASTHMA WITHOUT COMPLICATION: ICD-10-CM

## 2024-11-12 DIAGNOSIS — L30.4 INTERTRIGINOUS DERMATITIS ASSOCIATED WITH MOISTURE: Primary | ICD-10-CM

## 2024-11-12 DIAGNOSIS — Z79.899 ENCOUNTER FOR MEDICATION REVIEW: ICD-10-CM

## 2024-11-12 DIAGNOSIS — R39.81 FUNCTIONAL URINARY INCONTINENCE: ICD-10-CM

## 2024-11-12 DIAGNOSIS — I10 ESSENTIAL HYPERTENSION: ICD-10-CM

## 2024-11-12 DIAGNOSIS — Z78.9 LIVING IN ASSISTED LIVING: ICD-10-CM

## 2024-11-12 DIAGNOSIS — F41.1 GENERALIZED ANXIETY DISORDER: ICD-10-CM

## 2024-11-12 DIAGNOSIS — Z00.00 ENCOUNTER FOR MEDICARE ANNUAL WELLNESS EXAM: ICD-10-CM

## 2024-11-12 PROCEDURE — G0439 PPPS, SUBSEQ VISIT: HCPCS | Performed by: NURSE PRACTITIONER

## 2024-11-12 PROCEDURE — 99305 1ST NF CARE MODERATE MDM 35: CPT | Mod: 25 | Performed by: NURSE PRACTITIONER

## 2024-11-12 RX ORDER — DILTIAZEM HYDROCHLORIDE 180 MG/1
180 CAPSULE, EXTENDED RELEASE ORAL DAILY
Qty: 90 CAPSULE | Refills: 3 | Status: SHIPPED | OUTPATIENT
Start: 2024-11-12

## 2024-11-12 RX ORDER — CITALOPRAM HYDROBROMIDE 10 MG/1
10 TABLET ORAL DAILY
Qty: 90 TABLET | Refills: 3 | Status: SHIPPED | OUTPATIENT
Start: 2024-11-12

## 2024-11-12 RX ORDER — NYSTATIN 100000 U/G
CREAM TOPICAL DAILY PRN
Qty: 30 G | Refills: 3 | Status: SHIPPED | OUTPATIENT
Start: 2024-11-12

## 2024-11-12 RX ORDER — SODIUM PHOSPHATE,MONO-DIBASIC 19G-7G/118
1 ENEMA (ML) RECTAL DAILY
Qty: 90 CAPSULE | Refills: 3 | Status: SHIPPED | OUTPATIENT
Start: 2024-11-12

## 2024-11-12 RX ORDER — MULTIVITAMIN
1 TABLET ORAL DAILY
Qty: 90 TABLET | Refills: 3 | Status: SHIPPED | OUTPATIENT
Start: 2024-11-12

## 2024-11-12 RX ORDER — BIOTIN 1 MG
1000 TABLET ORAL DAILY
Qty: 90 TABLET | Refills: 3 | Status: SHIPPED | OUTPATIENT
Start: 2024-11-12

## 2024-11-12 RX ORDER — FLUTICASONE PROPIONATE 220 UG/1
2 AEROSOL, METERED RESPIRATORY (INHALATION) 2 TIMES DAILY
Qty: 36 G | Refills: 11 | Status: SHIPPED | OUTPATIENT
Start: 2024-11-12

## 2024-11-12 RX ORDER — ASPIRIN 81 MG/1
81 TABLET ORAL DAILY
Qty: 90 TABLET | Refills: 3 | Status: SHIPPED | OUTPATIENT
Start: 2024-11-12

## 2024-11-12 SDOH — HEALTH STABILITY: PHYSICAL HEALTH: ON AVERAGE, HOW MANY DAYS PER WEEK DO YOU ENGAGE IN MODERATE TO STRENUOUS EXERCISE (LIKE A BRISK WALK)?: 5 DAYS

## 2024-11-12 SDOH — HEALTH STABILITY: PHYSICAL HEALTH: ON AVERAGE, HOW MANY MINUTES DO YOU ENGAGE IN EXERCISE AT THIS LEVEL?: 10 MIN

## 2024-11-12 ASSESSMENT — ASTHMA QUESTIONNAIRES
ACT_TOTALSCORE: 16
QUESTION_5 LAST FOUR WEEKS HOW WOULD YOU RATE YOUR ASTHMA CONTROL: SOMEWHAT CONTROLLED
ACUTE_EXACERBATION_TODAY: NO
ACT_TOTALSCORE: 16
QUESTION_4 LAST FOUR WEEKS HOW OFTEN HAVE YOU USED YOUR RESCUE INHALER OR NEBULIZER MEDICATION (SUCH AS ALBUTEROL): ONE OR TWO TIMES PER DAY
QUESTION_1 LAST FOUR WEEKS HOW MUCH OF THE TIME DID YOUR ASTHMA KEEP YOU FROM GETTING AS MUCH DONE AT WORK, SCHOOL OR AT HOME: A LITTLE OF THE TIME
QUESTION_2 LAST FOUR WEEKS HOW OFTEN HAVE YOU HAD SHORTNESS OF BREATH: ONCE A DAY
QUESTION_3 LAST FOUR WEEKS HOW OFTEN DID YOUR ASTHMA SYMPTOMS (WHEEZING, COUGHING, SHORTNESS OF BREATH, CHEST TIGHTNESS OR PAIN) WAKE YOU UP AT NIGHT OR EARLIER THAN USUAL IN THE MORNING: NOT AT ALL

## 2024-11-12 ASSESSMENT — SOCIAL DETERMINANTS OF HEALTH (SDOH): HOW OFTEN DO YOU GET TOGETHER WITH FRIENDS OR RELATIVES?: MORE THAN THREE TIMES A WEEK

## 2024-11-12 NOTE — PROGRESS NOTES
[facility]  GINI Bell CNP, Family Medicine  Nov 12, 2024      Assessment & Plan     Encounter for Medicare annual wellness exam  Mammo done 82/2010 (impression: negative). No further testing  -DEXA done no results, no further tersting  -Colon cancer screening no results, no further testing  Immunizations: Patient is due for the following: RSV and Tdap refuses TDAP refuses   -Derm: Does patient regularly see dermatologist? no  -Refills pended for requested medications.    Mavis and daughters present during visit who are very involved in her care--daughter who is a nurse sets up her medications provides frequent prompts, reminders and visits frequently.  There have not been any falls reported--after working with home care skilled nursing, PT and OT services--PT had recommended a chair that was better suited to her body habitus for positioning  As her previous chair seat was too short and was causing friction skin ulcers.  Has remained absolutely ulcer free, she is independent with her walker does attend activities at the assisted living, she is social with residents and staff and overall her current living residence is very appropriate and quality of life is optimal at this stage.    Nursing staff had reported some higher blood pressure readings 160s to 180s systolically--- I did personally recheck her manual blood pressure during our visit after she was comfortable and relaxed and systolic blood pressure 140s which is very reasonable and appropriate for her age and state of health--the risks of overcorrection contributing to falls and other adverse effects outweigh any benefit to adjusting medications.  I did talk this over with Mavis and her daughter Andie during her visit and they are very comfortable with leaving current medications at current doses--when Andie visits she will check blood pressures and [place] back with me in a week or 2  if there are any consistently higher blood pressure readings.    Mavis is incontinent of urine, does wear disposable pull-up briefs and also wears a liner pad--has had some intermittent issues with groin fold and perineal itching--had treated with an over-the-counter Monistat cream on today's visit mostly resolved--would continue with the zinc oxide barrier cream which her daughter reports she is able to self apply when toileting to protect her skin from the excessive moisture along with changing disposable underwear frequently to maintain dryness and hygiene.    We did have some discussion regarding her supplements and considering pill reduction--- at this time family would like to continue her current supplements and revisit in the future.    No additional concerns raised today other than the history of higher systolic blood pressure readings and consistent intermittent issues with peritoneal dermatitis from incontinence        (Z00.00) Encounter for Medicare annual wellness exam  (primary encounter diagnosis)      (F41.1) Generalized anxiety disorder    Plan: citalopram (CELEXA) 10 MG tablet            (I10) Essential hypertension    Plan: aspirin 81 MG EC tablet, diltiazem ER (DILT-XR)        180 MG 24 hr capsule            (J45.30) Mild persistent asthma without complication    Plan: fluticasone (FLOVENT HFA) 220 MCG/ACT inhaler            (L30.4) Intertriginous dermatitis associated with moisture    Plan: nystatin (MYCOSTATIN) 568934 UNIT/GM external         cream            (Z00.00) Healthcare maintenance    Plan: biotin 1000 MCG TABS tablet,         glucosamine-chondroitin 500-400 MG CAPS per         capsule, multivitamin w/minerals         (MULTI-VITAMIN) tablet            (Z79.899) Encounter for medication review      (R39.81) Functional urinary incontinence      (Z78.9) Living in assisted living            Patient has been advised of split billing requirements and indicates understanding:  "Yes        BMI  Estimated body mass index is 28.49 kg/m  as calculated from the following:    Height as of 4/30/24: 1.581 m (5' 2.25\").    Weight as of 6/11/24: 71.2 kg (157 lb).     Counseling  Appropriate preventive services were addressed with this patient via screening, questionnaire, or discussion as appropriate for fall prevention, nutrition, physical activity, Tobacco-use cessation, social engagement, weight loss and cognition.  Checklist reviewing preventive services available has been given to the patient.  Reviewed patient's diet, addressing concerns and/or questions.   The patient was instructed to see the dentist every 6 months.   Updated plan of care.  Patient reported difficulty with activities of daily living were addressed today.The patient was provided with written information regarding signs of hearing loss.   Information on urinary incontinence and treatment options given to patient.       No follow-ups on file.    Ca Gamble is a 89 year old, presenting for the following:  Medicare Visit    Health Care Directive  Patient does not have a Health Care Directive: Advance Directive received and scanned. Click on Code in the patient header to view.      11/12/2024   General Health   How would you rate your overall physical health? (!) FAIR   Feel stress (tense, anxious, or unable to sleep) Not at all            11/12/2024   Nutrition   Diet: Regular (no restrictions)            11/12/2024   Exercise   Days per week of moderate/strenous exercise 5 days   Average minutes spent exercising at this level 10 min            11/12/2024   Social Factors   Frequency of gathering with friends or relatives More than three times a week   Worry food won't last until get money to buy more No   Food not last or not have enough money for food? No   Do you have housing? (Housing is defined as stable permanent housing and does not include staying ouside in a car, in a tent, in an abandoned building, in an " overnight shelter, or couch-surfing.) Yes   Are you worried about losing your housing? No   Lack of transportation? No   Unable to get utilities (heat,electricity)? No            11/12/2024   Fall Risk   Fallen 2 or more times in the past year? No    Trouble with walking or balance? Yes    Reason Gait Speed Test Not Completed Patient declines       Patient-reported          11/12/2024   Activities of Daily Living- Home Safety   Needs help with the following daily activites Transportation    Shopping    Preparing meals    Housework    Laundry    Medication administration    Money management   Safety concerns in the home None of the above       Multiple values from one day are sorted in reverse-chronological order         11/12/2024   Dental   Dentist two times every year? (!) NO            11/12/2024   Hearing Screening   Hearing concerns? (!) IT'S HARD TO FOLLOW A CONVERSATION IN A NOISY RESTAURANT OR CROWDED ROOM.            11/12/2024   Driving Risk Screening   Patient/family members have concerns about driving (!) DECLINE            11/12/2024   General Alertness/Fatigue Screening   Have you been more tired than usual lately? No            11/12/2024   Urinary Incontinence Screening   Bothered by leaking urine in past 6 months Yes            4/30/2024   TB Screening   Were you born outside of the [place]? No              Today's PHQ-2 Score:       11/12/2024    12:01 PM   PHQ-2 ( 1999 [employer])   Q1: Little interest or pleasure in doing things 0   Q2: Feeling down, depressed or hopeless 0   PHQ-2 Score 0         11/12/2024   Substance Use   Alcohol more than 3/day or more than 7/wk Not Applicable   Do you have a current opioid prescription? No   How severe/bad is pain from 1 to 10? 0/10 (No Pain)   Do you use any other substances recreationally? No        Social History     Tobacco Use    Smoking status: Never     Passive exposure: Past    Smokeless tobacco: Never   Vaping Use    Vaping status: Never Used   Substance  "Use Topics    Alcohol use: Yes     Comment: occasionally    Drug use: Never                       Current providers sharing in care for this patient include:  Patient Care Team:  Dotty Smith MD as PCP - General (Family Medicine)  Ana Maria Roberson APRN CNP as Assigned PCP    The following health maintenance items are reviewed in Epic and correct as of today:  Health Maintenance   Topic Date Due    ASTHMA ACTION PLAN  Never done    DTAP/TDAP/TD IMMUNIZATION (1 - Tdap) Never done    RSV VACCINE (1 - 1-dose 75+ series) Never done    BMP  04/30/2025    ASTHMA CONTROL TEST  05/12/2025    MEDICARE ANNUAL WELLNESS VISIT  11/12/2025    FALL RISK ASSESSMENT  11/12/2025    ADVANCE CARE PLANNING  04/30/2029    PHQ-2 (once per calendar year)  Completed    INFLUENZA VACCINE  Completed    Pneumococcal Vaccine: 65+ Years  Completed    COVID-19 Vaccine  Completed    HPV IMMUNIZATION  Aged Out    MENINGITIS IMMUNIZATION  Aged Out    RSV MONOCLONAL ANTIBODY  Aged Out    ZOSTER IMMUNIZATION  Discontinued          Objective    Exam  BP (!) 162/88 (BP Location: Right arm, Patient Position: Sitting, Cuff Size: Adult Regular)   Pulse 73   Temp 97.3  F (36.3  C)   Resp 16   LMP 04/30/1979   SpO2 93%    Estimated body mass index is 28.49 kg/m  as calculated from the following:    Height as of 4/30/24: 1.581 m (5' 2.25\").    Weight as of 6/11/24: 71.2 kg (157 lb).             11/12/2024   Mini Cog   Clock Draw Score 2 Normal   3 Item Recall 3 objects recalled   Mini Cog Total Score 5                 Signed Electronically by: GINI Bell CNP        [place]hal Nam is a 89 year old female being seen today for acute and follow up visit visit at [facility].    Code Status: DNR / DNI, Advance Directives: YES  Health Care Power of : Extended Emergency Contact Information  Primary Emergency Contact: Cyril Bah  Home Phone: [phone]  Relation: Daughter     Allergies: Chocolate and Coffee bean extract " [coffea arabica]     Chief Complaint / HPI: Nursing staff requesting follow-up on high blood pressure readings over the past week ranging 170s-180s systolically  And intertriginous dermatitis groin folds and perineum secondary to moisture.  Had tried over-the-counter miconazole cream for 3 days  And currently using Desitin barrier cream to protect skin from moisture secondary to urinary incontinence.  Reports feels better however intermittently feels itchy.    Past Medical, Surgical, Family and Social History reviewed: YES.     Medications: reviewed and reconciled--family provides medication adminstration sets up pill box and provides reminders  Medications - recent changes: Zinc oxide barrier cream, anti yeast OTC    Review of Systems:  Skin: positive for rash, itching  CV: positive for HTN  : positive for incontinence  Musculoskeletal: positive for arthritis and muscular weakness  All other systems negative  Toileting:    Continent of Bowel: Yes   Continent of Bladder: No  Mobility: walker    Recent Labs: None      Current Therapies: none    Exam:  Vital signs reviewed.   GENERAL APPEARANCE: alert and no distress  EYES: Eyes grossly normal to inspection  MS: no musculoskeletal defects are noted and gait is walker dependent  SKIN: Overall dry, perineum and groin folds mostly clearing yeast dermatitis rash--very pale in diameter reduced mostly remains left inner thigh labia  No open or draining areas  NEURO: Alert,mentation intact and speech normal  PSYCH: mentation appears normal and affect normal/bright    Assessment and Plan:      Very gracious pleasant lady who resides in assisted living, independent with toileting--has family support for medication administration  Who provides regular monitoring----nursing staff at assisted living are minimally involved in care at this time    Persistent urinary incontinence contributing to intermittent perineal wrongful dermatitis--currently almost healed  Mavis has used  over-the-counter vagisil for symptom management--  Daughter who is a nurse and very involved in her care visits frequently and provides showering, shampooing and oversees disposable incontinence supplies  We discussed and decided to manage with hygiene measures, zinc oxide barrier cream and for flares will utilize a as needed nystatin to affected area.    Daughter who is a nurse will continue to monitor blood pressures and will [place] back with me to review in the next 1 to 2 weeks before considering any medication changes.    Over 15 minutes spent on exam, medication review, medication adjustment and management separate from preventative services.    (L30.4) Intertriginous dermatitis associated with moisture  (primary encounter diagnosis)    Plan: nystatin (MYCOSTATIN) 732908 UNIT/GM external         cream            (I10) Essential hypertension    Plan: aspirin 81 MG EC tablet, diltiazem ER (DILT-XR)        180 MG 24 hr capsule              (Z79.899) Encounter for medication review      (R39.81) Functional urinary incontinence      (Z78.9) Living in assisted living

## 2024-11-15 ENCOUNTER — DOCUMENTATION ONLY (OUTPATIENT)
Dept: OTHER | Facility: CLINIC | Age: 89
End: 2024-11-15
Payer: MEDICARE

## 2024-12-03 ENCOUNTER — NURSING HOME VISIT (OUTPATIENT)
Dept: GERIATRICS | Facility: OTHER | Age: 89
End: 2024-12-03
Attending: NURSE PRACTITIONER
Payer: MEDICARE

## 2024-12-03 VITALS
OXYGEN SATURATION: 91 % | SYSTOLIC BLOOD PRESSURE: 132 MMHG | DIASTOLIC BLOOD PRESSURE: 74 MMHG | WEIGHT: 149.8 LBS | BODY MASS INDEX: 27.18 KG/M2 | TEMPERATURE: 97 F | HEART RATE: 57 BPM | RESPIRATION RATE: 20 BRPM

## 2024-12-03 DIAGNOSIS — R60.0 BILATERAL LOWER EXTREMITY EDEMA: ICD-10-CM

## 2024-12-03 DIAGNOSIS — Z78.9 LIVING IN ASSISTED LIVING: ICD-10-CM

## 2024-12-03 DIAGNOSIS — J45.30 MILD PERSISTENT ASTHMA WITHOUT COMPLICATION: ICD-10-CM

## 2024-12-03 DIAGNOSIS — I10 ESSENTIAL HYPERTENSION: Primary | ICD-10-CM

## 2024-12-03 DIAGNOSIS — K59.09 CHRONIC CONSTIPATION: ICD-10-CM

## 2024-12-03 ASSESSMENT — ENCOUNTER SYMPTOMS
FATIGUE: 0
SHORTNESS OF BREATH: 1
COUGH: 1
DIZZINESS: 0
FEVER: 0
CONSTIPATION: 1
LIGHT-HEADEDNESS: 0
WHEEZING: 1
HEADACHES: 0
CHILLS: 0
CHEST TIGHTNESS: 0
NERVOUS/ANXIOUS: 0

## 2024-12-03 NOTE — PROGRESS NOTES
Mavis Nam is a 89 year old female being seen today for follow up visit at [facility].    Assessment & Plan       ICD-10-CM    1. Essential hypertension  I10       2. Chronic constipation  K59.09       3. Mild persistent asthma without complication  J45.30       4. Bilateral lower extremity edema  R60.0       5. Living in assisted living  Z78.9       Follow-up visit to assess blood pressures.    Mavis has her blood pressures checked monthly by assisted living facility staff. Facility BP's reviewed today. Of note there were only two values provided. SBP rages from 179-183. Both pressures were checked with automatic cuff and no subsequent recheck values available for review.   Mavis's daughter, Andie is a great advocate for her mother and is very involved in her care. She has been taking her mom's BP's a bit more frequently to see if they are chronically elevated.     Mavis has all of her medications meticulously set up and managed by her daughter, Andie. She is currently taking diltiazem  mg PO daily, triamterene-hydrochlorothiazide 37.5-25 mg tablets, take 1 tablet PO daily, and 81 mg EC aspirin daily.    Writer was able to see Andie's notes and SBP's run in the 150's, taken with automatic cuff. Writer was able to obtain manual BP this afternoon. Mavis was resting and lying back in her recliner. Manual BP is 132/74.     Today on exam, Mavis denies any chest pain, increased shortness of breath from baseline, headaches, dizziness, or chest pressure. She does endorse that she is constipated, however she deals with this chronically. Her daughter is going to be picking up some senna-docusate to add to her bowel regimen in addition to Metamucil, Miralax, and PRN senna tea. Bowel sounds WNL, all quadrants. Mild distension noted, but soft. Mavis denies any abdominal tenderness.  Heart rate and rhythm regular. Scattered wheezes auscultated throughout lung fields. This is not abnormal, and is baseline  due to mild persistent asthma, per Mavis.    At this time, given reassuring BP readings that are well within parameters for Mavis's age and co morbid conditions, we will continue to have staff obtain BP's monthly, with plan to recheck 15 minutes later, if SBP is >160.    Mavis and her daughter bring forth no additional concerns during today's visit.    Discussed signs/ symptoms that would warrant urgent/ emergent follow-up. Patient and daughter in agreement with plan. All questions and concerns addressed this visit.       51 minutes spent by me on the date of the encounter doing chart review, history and exam, documentation and further activities per the note    Encouraged regular exercise as tolerated.     Return if symptoms worsen or fail to improve.    GINI Ponce CNP  [facility]     Code Status: DNR / DNI.   Health Care Power of : Extended Emergency Contact Information  Primary Emergency Contact: Niurka Nam  Home Phone: [phone]  Relation: Daughter     Allergies: Chocolate and Coffee bean extract [coffea arabica]     Past Medical, Surgical, Family and Social History reviewed: YES.     Medications:   Current Outpatient Medications   Medication Sig Dispense Refill    albuterol (PROAIR HFA/PROVENTIL HFA/VENTOLIN HFA) 108 (90 Base) MCG/ACT inhaler Inhale 2 puffs into the lungs every 4 hours as needed for shortness of breath, wheezing or cough 18 g 11    aspirin 81 MG EC tablet Take 1 tablet (81 mg) by mouth daily. 90 tablet 3    biotin 1000 MCG TABS tablet Take 1 tablet (1,000 mcg) by mouth daily. 90 tablet 3    citalopram (CELEXA) 10 MG tablet Take 1 tablet (10 mg) by mouth daily. 90 tablet 3    Cranberry 600 MG TABS Take 650 capsules by mouth daily      diltiazem ER (DILT-XR) 180 MG 24 hr capsule Take 1 capsule (180 mg) by mouth daily. 90 capsule 3    fluticasone (FLOVENT HFA) 220 MCG/ACT inhaler Inhale 2 puffs into the lungs 2 times daily. 36 g 11     glucosamine-chondroitin 500-400 MG CAPS per capsule Take 1 capsule by mouth daily. 425 mg tablets 90 capsule 3    multivitamin w/minerals (MULTI-VITAMIN) tablet Take 1 tablet by mouth daily. Proactive 65 plus 90 tablet 3    nystatin (MYCOSTATIN) 962834 UNIT/GM external cream Apply topically daily as needed for dry skin. 30 g 3    triamcinolone (KENALOG) 0.5 % external ointment Apply bid prn itching 45 g 11    triamterene-HCTZ (MAXZIDE-25) 37.5-25 MG tablet Take 1 tablet by mouth daily 90 tablet 4         Review of Systems   Constitutional:  Negative for chills, fatigue and fever.   Respiratory:  Positive for cough (baseline), shortness of breath (baseline) and wheezing. Negative for chest tightness.         Mild persistent asthma without complication   Cardiovascular:         HTN   Gastrointestinal:  Positive for constipation.   Neurological:  Negative for dizziness, light-headedness and headaches.   Psychiatric/Behavioral:  The patient is not nervous/anxious.         Generalized anxiety disorder   All other systems reviewed and are negative.      Toileting:    Continent of Bowel: Yes   Continent of Bladder: No  Mobility: walker    Recent Labs: None    Current Therapies: none    Physical Exam  Vitals reviewed.   Constitutional:       Appearance: Normal appearance.   Cardiovascular:      Rate and Rhythm: Normal rate and regular rhythm.      Heart sounds: Normal heart sounds.   Pulmonary:      Effort: No accessory muscle usage.      Breath sounds: Normal air entry. Wheezing (throughout) present.   Abdominal:      General: Bowel sounds are normal. There is distension.      Palpations: Abdomen is soft.      Tenderness: There is no abdominal tenderness. There is no guarding.   Musculoskeletal:      Right lower leg: Edema (1+) present.      Left lower leg: Edema (trace) present.   Skin:     General: Skin is warm and dry.   Neurological:      Mental Status: She is alert. Mental status is at baseline.      Gait: Gait  abnormal (uses walker).

## 2025-01-23 ENCOUNTER — LAB REQUISITION (OUTPATIENT)
Dept: LAB | Facility: OTHER | Age: OVER 89
End: 2025-01-23
Payer: MEDICARE

## 2025-01-23 ENCOUNTER — TELEPHONE (OUTPATIENT)
Dept: GERIATRICS | Facility: OTHER | Age: OVER 89
End: 2025-01-23

## 2025-01-23 DIAGNOSIS — J45.30 MILD PERSISTENT ASTHMA WITHOUT COMPLICATION: ICD-10-CM

## 2025-01-23 DIAGNOSIS — R09.89 OTHER SPECIFIED SYMPTOMS AND SIGNS INVOLVING THE CIRCULATORY AND RESPIRATORY SYSTEMS: ICD-10-CM

## 2025-01-23 DIAGNOSIS — J10.1 INFLUENZA A: Primary | ICD-10-CM

## 2025-01-23 LAB
FLUAV RNA SPEC QL NAA+PROBE: POSITIVE
FLUBV RNA RESP QL NAA+PROBE: NEGATIVE
RSV RNA SPEC NAA+PROBE: NEGATIVE
SARS-COV-2 RNA RESP QL NAA+PROBE: NEGATIVE

## 2025-01-23 PROCEDURE — 87637 SARSCOV2&INF A&B&RSV AMP PRB: CPT | Performed by: NURSE PRACTITIONER

## 2025-01-23 RX ORDER — OSELTAMIVIR PHOSPHATE 30 MG/1
30 CAPSULE ORAL 2 TIMES DAILY
Qty: 10 CAPSULE | Refills: 0 | Status: SHIPPED | OUTPATIENT
Start: 2025-01-23 | End: 2025-01-28

## 2025-01-23 RX ORDER — PREDNISONE 20 MG/1
20 TABLET ORAL DAILY
Qty: 5 TABLET | Refills: 0 | Status: SHIPPED | OUTPATIENT
Start: 2025-01-23 | End: 2025-01-28

## 2025-01-23 NOTE — TELEPHONE ENCOUNTER
"Email received from José Elizabeth RN stating:    \"Mavis Nam : 1927 has wheezing, coughing, and congestion. Vitals are as follows: BP-158/80, T- 101.0, P-98, R- 22, O2- 86. Resident declined going int o the clinic at this time. Daughter is here and aware. Wondering if I can get an order for a multiplex test for her?\"    [facility] RN will be going over to obtain multiplex swab and bring to the clinic for processing.     Multiplex positive for Influenza A. Mavis has asthma and would benefit from a short burst of steroids in addition to her Tamiflu.       1. Influenza A (Primary)    - oseltamivir (TAMIFLU) 30 MG capsule; Take 1 capsule (30 mg) by mouth 2 times daily for 5 days.  Dispense: 10 capsule; Refill: 0  - predniSONE (DELTASONE) 20 MG tablet; Take 1 tablet (20 mg) by mouth daily for 5 days.  Dispense: 5 tablet; Refill: 0    2. Mild persistent asthma without complication    - oseltamivir (TAMIFLU) 30 MG capsule; Take 1 capsule (30 mg) by mouth 2 times daily for 5 days.  Dispense: 10 capsule; Refill: 0  - predniSONE (DELTASONE) 20 MG tablet; Take 1 tablet (20 mg) by mouth daily for 5 days.  Dispense: 5 tablet; Refill: 0    GINI Ponce CNP on 2025 at 4:20 PM      "

## 2025-03-07 ENCOUNTER — LAB REQUISITION (OUTPATIENT)
Dept: LAB | Facility: OTHER | Age: OVER 89
End: 2025-03-07
Payer: MEDICARE

## 2025-03-07 DIAGNOSIS — R35.0 FREQUENCY OF MICTURITION: ICD-10-CM

## 2025-03-07 LAB
ALBUMIN UR-MCNC: 10 MG/DL
APPEARANCE UR: ABNORMAL
BILIRUB UR QL STRIP: NEGATIVE
COLOR UR AUTO: ABNORMAL
GLUCOSE UR STRIP-MCNC: NEGATIVE MG/DL
HGB UR QL STRIP: NEGATIVE
KETONES UR STRIP-MCNC: NEGATIVE MG/DL
LEUKOCYTE ESTERASE UR QL STRIP: ABNORMAL
NITRATE UR QL: POSITIVE
PH UR STRIP: 6 [PH] (ref 5–9)
RBC URINE: 29 /HPF
SP GR UR STRIP: 1.01 (ref 1–1.03)
UROBILINOGEN UR STRIP-MCNC: NORMAL MG/DL
WBC CLUMPS #/AREA URNS HPF: PRESENT /HPF
WBC URINE: 138 /HPF
YEAST #/AREA URNS HPF: ABNORMAL /HPF

## 2025-03-07 PROCEDURE — 81001 URINALYSIS AUTO W/SCOPE: CPT | Performed by: NURSE PRACTITIONER

## 2025-03-07 PROCEDURE — 87186 SC STD MICRODIL/AGAR DIL: CPT | Performed by: NURSE PRACTITIONER

## 2025-03-10 LAB — BACTERIA UR CULT: ABNORMAL

## 2025-04-12 ENCOUNTER — MYC MEDICAL ADVICE (OUTPATIENT)
Dept: GERIATRICS | Facility: OTHER | Age: OVER 89
End: 2025-04-12
Payer: MEDICARE

## 2025-04-12 DIAGNOSIS — R39.9 UTI SYMPTOMS: Primary | ICD-10-CM

## 2025-04-14 ENCOUNTER — LAB REQUISITION (OUTPATIENT)
Dept: LAB | Facility: OTHER | Age: OVER 89
End: 2025-04-14
Payer: MEDICARE

## 2025-04-14 DIAGNOSIS — N39.0 URINARY TRACT INFECTION, SITE NOT SPECIFIED: ICD-10-CM

## 2025-04-14 LAB
ALBUMIN UR-MCNC: NEGATIVE MG/DL
APPEARANCE UR: CLEAR
BACTERIA #/AREA URNS HPF: ABNORMAL /HPF
BILIRUB UR QL STRIP: NEGATIVE
COLOR UR AUTO: ABNORMAL
GLUCOSE UR STRIP-MCNC: NEGATIVE MG/DL
HGB UR QL STRIP: NEGATIVE
KETONES UR STRIP-MCNC: NEGATIVE MG/DL
LEUKOCYTE ESTERASE UR QL STRIP: ABNORMAL
MUCOUS THREADS #/AREA URNS LPF: PRESENT /LPF
NITRATE UR QL: NEGATIVE
PH UR STRIP: 6.5 [PH] (ref 5–9)
RBC URINE: 1 /HPF
SP GR UR STRIP: 1.01 (ref 1–1.03)
UROBILINOGEN UR STRIP-MCNC: NORMAL MG/DL
WBC URINE: 16 /HPF

## 2025-04-14 PROCEDURE — 87186 SC STD MICRODIL/AGAR DIL: CPT | Performed by: NURSE PRACTITIONER

## 2025-04-14 PROCEDURE — 81001 URINALYSIS AUTO W/SCOPE: CPT | Performed by: NURSE PRACTITIONER

## 2025-04-14 NOTE — TELEPHONE ENCOUNTER
Having s/s of UTI but refusing to go in to be seen.     Would you be able to see her?     Routing to provider to review and respond.  Yosef Conley RN on 4/14/2025 at 7:51 AM

## 2025-04-14 NOTE — TELEPHONE ENCOUNTER
Faxed UA orders to [facility] Attn: Nursing at fax [phone].     Aneta Sterling RN on 4/14/2025 at 12:05 PM

## 2025-04-14 NOTE — TELEPHONE ENCOUNTER
Called [facility]. They need UA order faxed to them.     Fax to [phone]    Yosef Conley RN on 4/14/2025 at 9:25 AM

## 2025-04-15 ENCOUNTER — NURSING HOME VISIT (OUTPATIENT)
Dept: GERIATRICS | Facility: OTHER | Age: OVER 89
End: 2025-04-15
Attending: NURSE PRACTITIONER
Payer: MEDICARE

## 2025-04-15 VITALS
WEIGHT: 152 LBS | SYSTOLIC BLOOD PRESSURE: 155 MMHG | HEART RATE: 62 BPM | DIASTOLIC BLOOD PRESSURE: 83 MMHG | OXYGEN SATURATION: 90 % | TEMPERATURE: 97.4 F | BODY MASS INDEX: 27.58 KG/M2 | RESPIRATION RATE: 16 BRPM

## 2025-04-15 DIAGNOSIS — R39.9 UTI SYMPTOMS: ICD-10-CM

## 2025-04-15 DIAGNOSIS — Z78.9 LIVING IN ASSISTED LIVING: ICD-10-CM

## 2025-04-15 DIAGNOSIS — N39.46 MIXED INCONTINENCE: ICD-10-CM

## 2025-04-15 DIAGNOSIS — N30.00 ACUTE CYSTITIS WITHOUT HEMATURIA: Primary | ICD-10-CM

## 2025-04-15 RX ORDER — CEFUROXIME AXETIL 250 MG/1
250 TABLET ORAL 2 TIMES DAILY
Qty: 14 TABLET | Refills: 0 | Status: SHIPPED | OUTPATIENT
Start: 2025-04-15

## 2025-04-15 ASSESSMENT — ENCOUNTER SYMPTOMS
COUGH: 1
FREQUENCY: 1
FATIGUE: 0
CHILLS: 0
FEVER: 0
HEADACHES: 0
LIGHT-HEADEDNESS: 0
DYSURIA: 1
DIZZINESS: 0
CHEST TIGHTNESS: 0
SHORTNESS OF BREATH: 1
NERVOUS/ANXIOUS: 0

## 2025-04-15 NOTE — PROGRESS NOTES
"Mavis Nam is a 89 year old female being seen today for acute visit at [facility].    Assessment & Plan       ICD-10-CM    1. Acute cystitis without hematuria  N30.00 cefuroxime (CEFTIN) 250 MG tablet      2. UTI symptoms  R39.9       3. Mixed incontinence  N39.46       4. Living in assisted living  Z78.9         On 4/12/25 my colleague received a MyChart message from Mavis's daughter with concerns for UTI. Mavis is experiencing dysuria and increased urinary frequency. Given that it was over the weekend, the message was not responded to until yesterday. UA/UC orders were placed 4/14/25. UA showed moderate leukocyte esterase, many bacteria, and 16 WBC's. The UA was reflexed to culture.   Mavis and her daughter are okay with waiting to start antibiotics until preliminary culture is available.     Today on exam, Mavis is sitting in her chair at her dining room table. Her daughter is helping apply her footwear. Mavis denies any flank pain or abdominal pain. She does endorse \"burning\", however \"not as bad as it was\".     Preliminary urine culture results show >100,000 CFU/mL Gram negative bacilli. Mavis's last urine culture was positive for E.coli at the beginning of March 2025. At that time, there was resistance to ampicillin, Zosyn, and cefazolin. She completed a 7-day course of cefuroxime. Mavis tolerated this well last time. Script sent for cefuroxime 250 mg PO BID x 7 days. Will adjust treatment as indicated by final culture and sensitivity results.    Mavis and her daughter bring forth no additional concerns during today's visit.    Discussed signs/ symptoms that would warrant urgent/ emergent follow-up. Patient and daughter in agreement with plan. All questions and concerns addressed this visit.     45 minutes spent by me on the date of the encounter doing chart review, history and exam, documentation and further activities per the note    Encouraged regular exercise as tolerated. "     Return if symptoms worsen or fail to improve.    GINI Ponce CNP  [facility]     Code Status: DNR / DNI.   Health Care Power of : Extended Emergency Contact Information  Primary Emergency Contact: Niurka Nam  Home Phone: [phone]  Relation: Daughter     Allergies: Chocolate and Coffee bean extract [coffea arabica]     Past Medical, Surgical, Family and Social History reviewed: YES.     Medications:   Current Outpatient Medications   Medication Sig Dispense Refill    cefuroxime (CEFTIN) 250 MG tablet Take 1 tablet (250 mg) by mouth 2 times daily. 14 tablet 0    albuterol (PROAIR HFA/PROVENTIL HFA/VENTOLIN HFA) 108 (90 Base) MCG/ACT inhaler Inhale 2 puffs into the lungs every 4 hours as needed for shortness of breath, wheezing or cough 18 g 11    aspirin 81 MG EC tablet Take 1 tablet (81 mg) by mouth daily. 90 tablet 3    biotin 1000 MCG TABS tablet Take 1 tablet (1,000 mcg) by mouth daily. 90 tablet 3    citalopram (CELEXA) 10 MG tablet Take 1 tablet (10 mg) by mouth daily. 90 tablet 3    Cranberry 600 MG TABS Take 650 capsules by mouth daily      diltiazem ER (DILT-XR) 180 MG 24 hr capsule Take 1 capsule (180 mg) by mouth daily. 90 capsule 3    fluticasone (FLOVENT HFA) 220 MCG/ACT inhaler Inhale 2 puffs into the lungs 2 times daily. 36 g 11    glucosamine-chondroitin 500-400 MG CAPS per capsule Take 1 capsule by mouth daily. 425 mg tablets 90 capsule 3    multivitamin w/minerals (MULTI-VITAMIN) tablet Take 1 tablet by mouth daily. Proactive 65 plus 90 tablet 3    nystatin (MYCOSTATIN) 495721 UNIT/GM external cream Apply topically daily as needed for dry skin. 30 g 3    triamcinolone (KENALOG) 0.5 % external ointment Apply bid prn itching 45 g 11    triamterene-HCTZ (MAXZIDE-25) 37.5-25 MG tablet Take 1 tablet by mouth daily 90 tablet 4         Review of Systems   Constitutional:  Negative for chills, fatigue and fever.   Respiratory:  Positive for cough (baseline)  and shortness of breath (baseline). Negative for chest tightness.         Mild persistent asthma without complication   Cardiovascular:         HTN   Genitourinary:  Positive for dysuria and frequency.   Neurological:  Negative for dizziness, light-headedness and headaches.   Psychiatric/Behavioral:  The patient is not nervous/anxious.         Generalized anxiety disorder   All other systems reviewed and are negative.      Toileting:    Continent of Bowel: Yes   Continent of Bladder: No  Mobility: walker    Recent Labs:   Recent Results (from the past 240 hours)   UA Macroscopic with reflex to Microscopic and Culture    Collection Time: 04/14/25 11:11 AM    Specimen: Urine, NOS   Result Value Ref Range    Color Urine Light Yellow Colorless, Straw, Light Yellow, Yellow    Appearance Urine Clear Clear    Glucose Urine Negative Negative mg/dL    Bilirubin Urine Negative Negative    Ketones Urine Negative Negative mg/dL    Specific Gravity Urine 1.010 1.000 - 1.030    Blood Urine Negative Negative    pH Urine 6.5 5.0 - 9.0    Protein Albumin Urine Negative Negative mg/dL    Urobilinogen Urine Normal Normal mg/dL    Nitrite Urine Negative Negative    Leukocyte Esterase Urine Moderate (A) Negative    Bacteria Urine Many (A) None Seen /HPF    Mucus Urine Present (A) None Seen /LPF    RBC Urine 1 <=2 /HPF    WBC Urine 16 (H) <=5 /HPF   Urine Culture    Collection Time: 04/14/25 11:11 AM    Specimen: Urine, NOS   Result Value Ref Range    Culture >100,000 CFU/mL Gram negative bacilli (A)         Current Therapies: none    Physical Exam  Vitals reviewed.   Constitutional:       Appearance: Normal appearance.   Cardiovascular:      Rate and Rhythm: Normal rate.   Pulmonary:      Effort: Pulmonary effort is normal. No accessory muscle usage.      Breath sounds: Normal air entry.   Abdominal:      Tenderness: There is no abdominal tenderness. There is no right CVA tenderness or left CVA tenderness.   Skin:     General: Skin is  warm and dry.   Neurological:      Mental Status: She is alert. Mental status is at baseline.      Gait: Gait abnormal (uses walker).   Psychiatric:         Mood and Affect: Mood normal.         Behavior: Behavior normal.

## 2025-04-16 LAB — BACTERIA UR CULT: ABNORMAL

## 2025-04-24 DIAGNOSIS — T36.95XA ANTIBIOTIC-INDUCED YEAST INFECTION: Primary | ICD-10-CM

## 2025-04-24 DIAGNOSIS — B37.9 ANTIBIOTIC-INDUCED YEAST INFECTION: Primary | ICD-10-CM

## 2025-04-24 RX ORDER — FLUCONAZOLE 150 MG/1
150 TABLET ORAL ONCE
Qty: 1 TABLET | Refills: 0 | Status: SHIPPED | OUTPATIENT
Start: 2025-04-24 | End: 2025-04-24

## 2025-04-24 NOTE — PROGRESS NOTES
1. Antibiotic-induced yeast infection (Primary)    - fluconazole (DIFLUCAN) 150 MG tablet; Take 1 tablet (150 mg) by mouth once for 1 dose.  Dispense: 1 tablet; Refill: 0    GINI Ponce CNP on 4/24/2025 at 10:27 AM

## 2025-04-27 ENCOUNTER — HEALTH MAINTENANCE LETTER (OUTPATIENT)
Age: OVER 89
End: 2025-04-27

## 2025-05-13 ENCOUNTER — NURSING HOME VISIT (OUTPATIENT)
Dept: GERIATRICS | Facility: OTHER | Age: OVER 89
End: 2025-05-13
Payer: MEDICARE

## 2025-05-13 DIAGNOSIS — I10 ESSENTIAL HYPERTENSION: ICD-10-CM

## 2025-05-13 DIAGNOSIS — J45.30 MILD PERSISTENT ASTHMA, UNSPECIFIED WHETHER COMPLICATED: ICD-10-CM

## 2025-05-13 DIAGNOSIS — M79.621 PAIN OF RIGHT UPPER ARM: ICD-10-CM

## 2025-05-13 DIAGNOSIS — L20.9 ATOPIC DERMATITIS, UNSPECIFIED TYPE: ICD-10-CM

## 2025-05-13 DIAGNOSIS — J45.30 MILD PERSISTENT ASTHMA WITHOUT COMPLICATION: ICD-10-CM

## 2025-05-13 DIAGNOSIS — M79.661 PAIN OF RIGHT LOWER LEG: ICD-10-CM

## 2025-05-13 DIAGNOSIS — M15.0 PRIMARY OSTEOARTHRITIS INVOLVING MULTIPLE JOINTS: Primary | ICD-10-CM

## 2025-05-13 DIAGNOSIS — Z79.899 ENCOUNTER FOR MEDICATION REVIEW: ICD-10-CM

## 2025-05-13 DIAGNOSIS — Z78.9 LIVING IN ASSISTED LIVING: ICD-10-CM

## 2025-05-13 DIAGNOSIS — F41.1 GENERALIZED ANXIETY DISORDER: ICD-10-CM

## 2025-05-13 DIAGNOSIS — M75.01 ADHESIVE CAPSULITIS OF RIGHT SHOULDER: ICD-10-CM

## 2025-05-13 DIAGNOSIS — R60.0 BILATERAL LOWER EXTREMITY EDEMA: ICD-10-CM

## 2025-05-13 RX ORDER — TRIAMCINOLONE ACETONIDE 1 MG/G
CREAM TOPICAL 2 TIMES DAILY PRN
Qty: 30 G | Refills: 3 | Status: SHIPPED | OUTPATIENT
Start: 2025-05-13

## 2025-05-13 RX ORDER — FLUTICASONE PROPIONATE 220 UG/1
2 AEROSOL, METERED RESPIRATORY (INHALATION) 2 TIMES DAILY
Qty: 36 G | Refills: 11 | Status: SHIPPED | OUTPATIENT
Start: 2025-05-13

## 2025-05-13 RX ORDER — CITALOPRAM HYDROBROMIDE 10 MG/1
10 TABLET ORAL DAILY
Qty: 90 TABLET | Refills: 3 | Status: SHIPPED | OUTPATIENT
Start: 2025-05-13

## 2025-05-13 RX ORDER — TRIAMTERENE AND HYDROCHLOROTHIAZIDE 37.5; 25 MG/1; MG/1
1 TABLET ORAL DAILY
Qty: 90 TABLET | Refills: 3 | Status: SHIPPED | OUTPATIENT
Start: 2025-05-13

## 2025-05-13 RX ORDER — ALBUTEROL SULFATE 90 UG/1
2 INHALANT RESPIRATORY (INHALATION) EVERY 4 HOURS PRN
Qty: 18 G | Refills: 11 | Status: SHIPPED | OUTPATIENT
Start: 2025-05-13

## 2025-05-13 NOTE — PROGRESS NOTES
Mavis Nam is a 89 year old female being seen today for comprehensive and follow up visit visit at [facility].    Code Status: DNR / DNI, Advance Directives: YES-  Health Care Power of : Extended Emergency Contact Information  Primary Emergency Contact: Niurka Nam  Home Phone: [phone]  Relation: Daughter     Allergies: Chocolate and Coffee bean extract [coffea arabica]     Chief Complaint / HPI: Follow-up for recent issues with right upper extremity particularly forearm and wrist pain--reports came on suddenly without movement--was worse few days ago in past couple of weeks--right leg pain  No injury or falls--walker dependent--ADLs in apartment with walker independently.  Mavis and daughter Andie who is an RN and her healthcare directive agent and very involved in her care very interested and home care physical therapy  Services to improve transfers, mobility.   Andie would like to review medications as she is due for yearly refills and review--- MDIs for asthma control have been reasonably effective--- has been utilizing her Ventolin 1-2 times a day  Mavis has been comfortable with this--there have not been any acute asthma exacerbations--she does monitor her allergy triggers--occasionally has some chocolate will utilize her MDIs and allergy medications with this  As she does feel tightness with her breathing--- allergies to chocolate and coffee  There have been some issues with a couple of UTIs--currently takes a cranberry supplement and wanted to discuss other possible supplements---  We talked about d-mannose--- at this time Andie will focus on more frequent showers or baths, maintaining perianal dryness for prevention  Most recently developed some yeast after finishing antibiotic treatment for UTI--the Diflucan one-time dose was effective-  Has 2 large seborrheic keratosis on her lower back both lateral sides and a patch of dry itchy skin in the center of her back--- wondering what to  try  For irritation she is not interested in any invasive cautery or excision procedures to excise the seborrheic keratosis        Past Medical, Surgical, Family and Social History reviewed: YES.     Medications:   Medications - recent changes: none    Review of Systems:  General: positive for weakness  CV: positive for lower extremity edema  : positive for incontinence  Musculoskeletal: positive for arthritis, joint pain, joint stiffness, and muscular weakness  All other systems negative  Toileting:    Continent of Bowel: Yes   Continent of Bladder: No  Mobility: walker    Recent Labs: None      Current Therapies: none    Exam:  Vital signs reviewed.   GENERAL APPEARANCE: alert and no distress  EYES: Eyes grossly normal to inspection  CV: regular rate, 1-2+ peripheral edema   MS: Right shoulder limited range of motion radiating to right upper extremity--tenderness with palpation-  SKIN: no suspicious lesions or rashes  NEURO: Normal strength and tone, sensory exam grossly normal, mentation intact and speech normal  PSYCH: mentation appears normal and affect normal/bright    Assessment and Plan:          (M15.0) Primary osteoarthritis involving  multiple joints  (primary encounter diagnosis) Roly report chronic history of frozen shoulder--likely adhesive capsulitis and has not been interested in any procedures and wished to treat conservatively  Would benefit from physical therapy to improve range of motion, mobility, self transfers and fall prevention.    Plan: Home Care Referral            (M81.831) Pain of right lower leg--no injury or falls--likely musculoskeletal--physical therapy will be beneficial to evaluate and treat to improve range of motion and mobility    Plan: Home Care Referral            (M54.551) Pain of right upper arm--likely tendinitis--possibly calcific tendinosis--Roly would like to treat with topical Voltaren gel which is very reasonable--heating pad may be beneficial  along with physical therapy  If pain worsens or becomes intolerable would like to discuss visit with sports orthopedic medicine for nonsurgical treatment for pain relief    Plan: Home Care Referral            (I10) Essential hypertension--stable under good control medications have been effective    Plan: triamterene-HCTZ (MAXZIDE-25) 37.5-25 MG tablet            (F41.1) Generalized anxiety disorder--stable medication has been effective at moderating anxiety    Plan: citalopram (CELEXA) 10 MG tablet            (J45.30) Mild persistent asthma without complication--chronic reactive to allergies she is aware of triggers--we discussed considering another controller medication--- at this time Mavis is comfortable with her twice daily Flovent and minimal use Ventolin  Has not had any significant asthma exacerbations or ED episodes over the past year--- her goals of care are conservative--- will relook at this if needed and indicated    Plan: fluticasone (FLOVENT HFA) 220 MCG/ACT inhaler,         albuterol (PROAIR HFA/PROVENTIL HFA/VENTOLIN         HFA) 108 (90 Base) MCG/ACT inhaler            (L20.9) Atopic dermatitis, unspecified type--suspect dry skin patches atopic dermatitis--- recommend in addition to moisturization triamcinolone as needed  Plan: triamcinolone (KENALOG) 0.1 % external cream            (Z79.899) Encounter for medication review--medications reviewed and reconciled--- daily scheduled medications refilled as requested for 1 year--will plan to schedule some yearly monitoring labs next facility lab day  We discussed alternatives to diltiazem extended release as insurance does not want to cover extended release medications--there were  regular release alternatives such as amlodipine and diltiazem regular release  Recently diltiazem extended release was filled for 90 days--- Andie would like to wait until refill is due and then would plan to trial regular release comparable total daily dose divided twice a  day  Andie plans to transition to [facility] pharmacy and medications were sent as requested      (J45.30) Mild persistent asthma, unspecified whether complicated--- chronic and stable      (M75.01) Adhesive capsulitis of right shoulder--chronic bothersome intermittently--would benefit from physical therapy to improve pain and use      (Z78.9) Living in assisted living--very appropriate at this stage to provide 24/7 support with essential ADLs to optimize independence and quality of life      (R60.0) Bilateral lower extremity edema--chronic, exacerbates with dependent position--- consider OT edema evaluation and recommendations      Over 60 minutes spent in direct face-to-face visit for home care therapy services, medication review and reconciliation, and medication management, ordering home yearly monitoring medications labs

## 2025-05-16 ENCOUNTER — TELEPHONE (OUTPATIENT)
Dept: FAMILY MEDICINE | Facility: OTHER | Age: OVER 89
End: 2025-05-16
Payer: MEDICARE

## 2025-05-16 NOTE — TELEPHONE ENCOUNTER
Ok to continue taking those medications if she has been taking them and they are working well for her.     Elizabeth Clayton NP on 5/16/2025 at 3:12 PM

## 2025-05-16 NOTE — TELEPHONE ENCOUNTER
The physical therapy evaluation and treatment that was ordered was completed on 5/16/25      Request for additional Home Care Physical Therapy orders as follows:      Continuation frequency =   ___2___  x week x  ___4___ week(s)    Effective date = 5/19/25        Interventions include:    Gait Training  Muscle strengthening exercises  Balance training  Transfer Training  Education - home safety  Instruction - home exercise program  Therapeutic exercise  Pain assessment & management  Aerobic capacity training  Home safety assessment  Neuro rehab                Therapist: Dorothy Sanchez DPT    Please respond with .HOMECAREAGREE, if you are in agreement. Please sign with your comments and signature, if you are not in agreement. Route to the  Home Care pool.

## 2025-05-16 NOTE — TELEPHONE ENCOUNTER
I agree with the Home Care order requests below.  Ana Maria Roberson, GINI CNP on 5/16/2025 at 4:06 PM     Hi all    I am the provider ordering and did F2F    Thanks    ESVIN

## 2025-05-16 NOTE — TELEPHONE ENCOUNTER
"URGENT: per CMS best practice, response is requested within 24 hours.    Home Care regulation mandates that you are notified about drug discrepancies, interactions & contraindications. Response within a 24 hour timeframe is established by CMS as \"best practice\" for the delivery of home health care. Home Care is required to report if the 24 hour timeframe was met. The home health clinician will contact you again if this timeframe is not met or if the response does not address all concerns.       Situation:        Upon admission, a med reconciliation was completed to identify any drug discrepancies, interactions or contraindications. Home Care's drug regime review has revealed significant medication issues.     You are being contacted for clarifying orders related to the medication issues.     Background:        Assessment:        The patient is taking the following medications which are not on her clinic list:  Biotin 5000 mcg daily  Miralax 17  gm daily as needed  Metamucil 1 tbl daily as needed  Smooth move Senna leaf extract 1080 mg as needed    Recommendations:    Please evaluate this information and indicate below whether or not changes are required. A copy of the patient's drug interaction/contraindications report is available upon request.       CLINIC NURSE - If changes are made, please update the Epic medication profile.     Please sign this encounter with your electronic signature and route to the  Home Care pool.        "

## 2025-05-17 NOTE — TELEPHONE ENCOUNTER
Yes, no changes needed--stable on this for long time  And family administer medications    GINI Bell CNP   May 17, 2025

## 2025-05-28 ENCOUNTER — TELEPHONE (OUTPATIENT)
Dept: FAMILY MEDICINE | Facility: OTHER | Age: OVER 89
End: 2025-05-28
Payer: MEDICARE

## 2025-05-28 DIAGNOSIS — M15.0 PRIMARY OSTEOARTHRITIS INVOLVING MULTIPLE JOINTS: Primary | ICD-10-CM

## 2025-05-28 DIAGNOSIS — R26.89 OTHER ABNORMALITIES OF GAIT AND MOBILITY: ICD-10-CM

## 2025-05-28 NOTE — TELEPHONE ENCOUNTER
Ana Maria Roberson NP was given home care or hospice form(s) for review and signature. Certification period effective 5/16/2025 to 7/14/2025..  Diana Charles RN on 5/28/2025 at 1:37 PM

## 2025-06-08 ENCOUNTER — HEALTH MAINTENANCE LETTER (OUTPATIENT)
Age: OVER 89
End: 2025-06-08

## 2025-06-10 DIAGNOSIS — L29.3 PERINEAL ITCHING, FEMALE: Primary | ICD-10-CM

## 2025-06-10 RX ORDER — FLUCONAZOLE 150 MG/1
150 TABLET ORAL ONCE
Qty: 1 TABLET | Refills: 0 | Status: SHIPPED | OUTPATIENT
Start: 2025-06-10 | End: 2025-06-10

## 2025-06-17 ENCOUNTER — NURSING HOME VISIT (OUTPATIENT)
Dept: GERIATRICS | Facility: OTHER | Age: OVER 89
End: 2025-06-17
Attending: NURSE PRACTITIONER
Payer: MEDICARE

## 2025-06-17 DIAGNOSIS — F41.1 GENERALIZED ANXIETY DISORDER: Primary | ICD-10-CM

## 2025-06-17 DIAGNOSIS — J45.30 MILD PERSISTENT ASTHMA WITHOUT COMPLICATION: ICD-10-CM

## 2025-06-17 DIAGNOSIS — N90.4 LICHEN SCLEROSUS OF VULVA: ICD-10-CM

## 2025-06-17 DIAGNOSIS — Z78.9 LIVES IN ASSISTED LIVING FACILITY: ICD-10-CM

## 2025-06-17 DIAGNOSIS — L29.2 VULVAR PRURITUS: ICD-10-CM

## 2025-06-17 RX ORDER — ALBUTEROL SULFATE 90 UG/1
INHALANT RESPIRATORY (INHALATION)
Qty: 18 G | Refills: 11 | Status: SHIPPED | OUTPATIENT
Start: 2025-06-17

## 2025-06-17 RX ORDER — CLOBETASOL PROPIONATE 0.5 MG/G
OINTMENT TOPICAL
Qty: 60 G | Refills: 3 | Status: SHIPPED | OUTPATIENT
Start: 2025-06-17

## 2025-06-17 ASSESSMENT — ENCOUNTER SYMPTOMS
SHORTNESS OF BREATH: 1
CHEST TIGHTNESS: 0
FATIGUE: 0
LIGHT-HEADEDNESS: 0
FEVER: 0
CHILLS: 0
COUGH: 1
DIZZINESS: 0
NERVOUS/ANXIOUS: 0
HEADACHES: 0

## 2025-06-17 NOTE — PROGRESS NOTES
"Mavis Nam is a 89 year old female being seen today for acute visit at [facility].    Assessment & Plan       ICD-10-CM    1. Generalized anxiety disorder  F41.1       2. Vulvar pruritus  L29.2 clobetasol (TEMOVATE) 0.05 % external ointment      3. Lichen sclerosus of vulva  N90.4 clobetasol (TEMOVATE) 0.05 % external ointment      4. Lives in assisted living facility  Z78.9         Mavis brings forth concerns of intermittent \"annoying\" itching and occasional burning around her vulva.    In the past, Mavis has tried OTC Monistat, OTC antifungals, and OTC hydrocortisone cream. She has also tried two doses of PO diflucan.  Mavis has dementia and therefore is unable to recall if there was any improvement in symptoms after using OTC preparations.    Today on exam, Mavis is getting out of the shower. Her daughter is present during our exam. There is noted to be hypopigmentation noted to the vulva. No vaginal discharge, erythema, or ulcerations present. Given characteristic hypopigmentation and intermittent pain/ pruritus, script sent for clobetasol 0.05% ointment, apply once nightly x 2 weeks, then may use nightly PRN with flares.     Mavis and her daughter bring forth no additional concerns during today's visit.    Discussed signs/ symptoms that would warrant urgent/ emergent follow-up. Patient and daughter in agreement with plan. All questions and concerns addressed this visit.     40 minutes spent by me on the date of the encounter doing chart review, history and exam, documentation and further activities per the note    Encouraged regular exercise as tolerated.     Return if symptoms worsen or fail to improve.    GINI Ponce [facility]     Code Status: DNR / DNI.   Health Care Power of : Extended Emergency Contact Information  Primary Emergency Contact: Niurka Nam  Home Phone: [phone]  Relation: Daughter     Allergies: Chocolate and Coffee bean " extract [coffea arabica]     Past Medical, Surgical, Family and Social History reviewed: YES.     Medications:   Current Outpatient Medications   Medication Sig Dispense Refill    clobetasol (TEMOVATE) 0.05 % external ointment Apply pea-sized amount to vulva before bed x 2 weeks. Then may use daily PRN with flares. 60 g 3    albuterol (PROAIR HFA/PROVENTIL HFA/VENTOLIN HFA) 108 (90 Base) MCG/ACT inhaler Inhale 2 puffs into the lungs every 4 hours as needed for shortness of breath, wheezing or cough. As covered by insurance 18 g 11    aspirin 81 MG EC tablet Take 1 tablet (81 mg) by mouth daily. 90 tablet 3    citalopram (CELEXA) 10 MG tablet Take 1 tablet (10 mg) by mouth daily. 90 tablet 3    Cranberry 600 MG TABS Take 650 capsules by mouth daily      diclofenac (VOLTAREN) 1 % topical gel Apply 2 g topically 4 times daily.      diltiazem ER (DILT-XR) 180 MG 24 hr capsule Take 1 capsule (180 mg) by mouth daily. 90 capsule 3    fluticasone (FLOVENT HFA) 220 MCG/ACT inhaler Inhale 2 puffs into the lungs 2 times daily. 36 g 11    glucosamine-chondroitin 500-400 MG CAPS per capsule Take 1 capsule by mouth daily. 425 mg tablets 90 capsule 3    ibuprofen (ADVIL/MOTRIN) 200 MG tablet Take 1 tablet (200 mg) by mouth every 4 hours as needed for pain.      Menthol, Topical Analgesic, (BIOFREEZE) 10 % CREA Externally apply topically.      multivitamin w/minerals (MULTI-VITAMIN) tablet Take 1 tablet by mouth daily. Proactive 65 plus 90 tablet 3    nystatin (MYCOSTATIN) 484321 UNIT/GM external cream Apply topically daily as needed for dry skin. 30 g 3    triamcinolone (KENALOG) 0.1 % external cream Apply topically 2 times daily as needed for irritation. 30 g 3    triamcinolone (KENALOG) 0.5 % external ointment Apply bid prn itching 45 g 11    triamterene-HCTZ (MAXZIDE-25) 37.5-25 MG tablet Take 1 tablet by mouth daily. 90 tablet 3         Review of Systems   Constitutional:  Negative for chills, fatigue and fever.   Respiratory:   Positive for cough (baseline) and shortness of breath (baseline). Negative for chest tightness.         Mild persistent asthma without complication   Cardiovascular:         HTN   Genitourinary:  Negative for genital sores and vaginal pain.        Vaginal itching     Neurological:  Negative for dizziness, light-headedness and headaches.   Psychiatric/Behavioral:  The patient is not nervous/anxious.         Generalized anxiety disorder   All other systems reviewed and are negative.      Toileting:    Continent of Bowel: Yes   Continent of Bladder: No  Mobility: walker    Recent Labs:   No results found for this or any previous visit (from the past 240 hours).       Current Therapies: none    Physical Exam  Vitals reviewed. Exam conducted with a chaperone present.   Constitutional:       Appearance: Normal appearance.   Cardiovascular:      Rate and Rhythm: Normal rate.   Pulmonary:      Effort: Pulmonary effort is normal. No accessory muscle usage.      Breath sounds: Normal air entry.   Abdominal:      Tenderness: There is no abdominal tenderness. There is no right CVA tenderness or left CVA tenderness.   Genitourinary:     Pubic Area: No rash.       Labia:         Right: No rash or tenderness.         Left: No rash or tenderness.       Comments: Hypopigmentation noted to vulva. No erythema, ulcerations, or vaginal discharge present.  Skin:     General: Skin is warm and dry.   Neurological:      Mental Status: She is alert. Mental status is at baseline.      Gait: Gait abnormal (uses walker).   Psychiatric:         Mood and Affect: Mood normal.         Behavior: Behavior normal.

## 2025-06-19 ENCOUNTER — MEDICAL CORRESPONDENCE (OUTPATIENT)
Dept: HEALTH INFORMATION MANAGEMENT | Facility: OTHER | Age: OVER 89
End: 2025-06-19
Payer: MEDICARE

## 2025-07-01 DIAGNOSIS — I10 ESSENTIAL HYPERTENSION: Primary | ICD-10-CM

## 2025-07-01 RX ORDER — DILTIAZEM HCL 90 MG
90 TABLET ORAL 2 TIMES DAILY
Qty: 60 TABLET | Refills: 3 | Status: SHIPPED | OUTPATIENT
Start: 2025-07-01

## 2025-08-11 DIAGNOSIS — I10 ESSENTIAL HYPERTENSION: ICD-10-CM

## 2025-08-11 RX ORDER — TRIAMTERENE AND HYDROCHLOROTHIAZIDE 37.5; 25 MG/1; MG/1
1 TABLET ORAL DAILY
Qty: 90 TABLET | Refills: 3 | Status: SHIPPED | OUTPATIENT
Start: 2025-08-11

## 2025-08-19 ENCOUNTER — RESULTS FOLLOW-UP (OUTPATIENT)
Dept: GERIATRICS | Facility: OTHER | Age: OVER 89
End: 2025-08-19

## 2025-08-19 ENCOUNTER — LAB REQUISITION (OUTPATIENT)
Dept: LAB | Facility: OTHER | Age: OVER 89
End: 2025-08-19
Payer: MEDICARE

## 2025-08-19 DIAGNOSIS — R30.0 DYSURIA: ICD-10-CM

## 2025-08-19 DIAGNOSIS — N30.00 ACUTE CYSTITIS WITHOUT HEMATURIA: Primary | ICD-10-CM

## 2025-08-19 DIAGNOSIS — R30.0 DYSURIA: Primary | ICD-10-CM

## 2025-08-19 DIAGNOSIS — R82.90 ABNORMAL URINE FINDINGS: ICD-10-CM

## 2025-08-19 LAB
ALBUMIN UR-MCNC: 20 MG/DL
APPEARANCE UR: ABNORMAL
BACTERIA #/AREA URNS HPF: ABNORMAL /HPF
BILIRUB UR QL STRIP: NEGATIVE
COLOR UR AUTO: YELLOW
GLUCOSE UR STRIP-MCNC: NEGATIVE MG/DL
HGB UR QL STRIP: NEGATIVE
KETONES UR STRIP-MCNC: NEGATIVE MG/DL
LEUKOCYTE ESTERASE UR QL STRIP: ABNORMAL
MUCOUS THREADS #/AREA URNS LPF: PRESENT /LPF
NITRATE UR QL: NEGATIVE
PH UR STRIP: 6 [PH] (ref 5–9)
RBC URINE: 2 /HPF
SP GR UR STRIP: 1.02 (ref 1–1.03)
UROBILINOGEN UR STRIP-MCNC: NORMAL MG/DL
WBC CLUMPS #/AREA URNS HPF: PRESENT /HPF
WBC URINE: >182 /HPF

## 2025-08-19 PROCEDURE — 87186 SC STD MICRODIL/AGAR DIL: CPT | Performed by: NURSE PRACTITIONER

## 2025-08-19 PROCEDURE — 81001 URINALYSIS AUTO W/SCOPE: CPT | Performed by: NURSE PRACTITIONER

## 2025-08-21 LAB — BACTERIA UR CULT: ABNORMAL

## 2025-08-21 RX ORDER — CEFUROXIME AXETIL 250 MG/1
250 TABLET ORAL 2 TIMES DAILY
Qty: 6 TABLET | Refills: 0 | Status: SHIPPED | OUTPATIENT
Start: 2025-08-21 | End: 2025-08-24

## 2025-08-26 ENCOUNTER — NURSING HOME VISIT (OUTPATIENT)
Dept: GERIATRICS | Facility: OTHER | Age: OVER 89
End: 2025-08-26
Attending: NURSE PRACTITIONER
Payer: MEDICARE

## 2025-08-26 ENCOUNTER — LAB REQUISITION (OUTPATIENT)
Dept: LAB | Facility: OTHER | Age: OVER 89
End: 2025-08-26
Payer: MEDICARE

## 2025-08-26 VITALS
RESPIRATION RATE: 20 BRPM | TEMPERATURE: 98.4 F | SYSTOLIC BLOOD PRESSURE: 164 MMHG | DIASTOLIC BLOOD PRESSURE: 76 MMHG | OXYGEN SATURATION: 96 % | WEIGHT: 156.6 LBS | HEART RATE: 75 BPM | BODY MASS INDEX: 28.41 KG/M2
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
## 2025-08-26 DIAGNOSIS — Z78.9 LIVES IN ASSISTED LIVING FACILITY: ICD-10-CM

## 2025-08-26 DIAGNOSIS — Z74.09 LIMITED MOBILITY: ICD-10-CM

## 2025-08-26 DIAGNOSIS — J45.30 MILD PERSISTENT ASTHMA, UNSPECIFIED WHETHER COMPLICATED: ICD-10-CM

## 2025-08-26 DIAGNOSIS — M15.0 PRIMARY OSTEOARTHRITIS INVOLVING MULTIPLE JOINTS: ICD-10-CM

## 2025-08-26 ASSESSMENT — ENCOUNTER SYMPTOMS
FEVER: 0
LIGHT-HEADEDNESS: 0
HEADACHES: 0
DIZZINESS: 0
CHEST TIGHTNESS: 0
WEAKNESS: 1
SHORTNESS OF BREATH: 1
COUGH: 1
CHILLS: 0
NERVOUS/ANXIOUS: 0
FATIGUE: 0
COLOR CHANGE: 1

## 2025-09-02 DIAGNOSIS — Z29.11 NEED FOR IMMUNIZATION AGAINST RESPIRATORY SYNCYTIAL VIRUS: Primary | ICD-10-CM

## 2025-09-02 DIAGNOSIS — J45.30 MILD PERSISTENT ASTHMA, UNSPECIFIED WHETHER COMPLICATED: ICD-10-CM
